# Patient Record
Sex: MALE | Race: WHITE | Employment: OTHER | URBAN - NONMETROPOLITAN AREA
[De-identification: names, ages, dates, MRNs, and addresses within clinical notes are randomized per-mention and may not be internally consistent; named-entity substitution may affect disease eponyms.]

---

## 2020-08-10 PROBLEM — E78.2 MIXED HYPERLIPIDEMIA: Status: ACTIVE | Noted: 2020-08-10

## 2020-08-10 PROBLEM — E11.9 DIABETES MELLITUS TYPE 2, CONTROLLED (HCC): Status: ACTIVE | Noted: 2020-08-10

## 2020-08-10 PROBLEM — I10 ESSENTIAL HYPERTENSION: Status: ACTIVE | Noted: 2020-08-10

## 2020-08-10 RX ORDER — DOCUSATE SODIUM 100 MG/1
100 CAPSULE, LIQUID FILLED ORAL 2 TIMES DAILY
COMMUNITY

## 2020-08-10 RX ORDER — ATORVASTATIN CALCIUM 20 MG/1
TABLET, FILM COATED ORAL DAILY
COMMUNITY
End: 2022-01-01 | Stop reason: SDUPTHER

## 2020-08-10 RX ORDER — METOPROLOL TARTRATE 50 MG/1
TABLET ORAL 2 TIMES DAILY
COMMUNITY
End: 2022-01-01

## 2020-08-10 RX ORDER — LATANOPROST 50 UG/ML
1 SOLUTION/ DROPS OPHTHALMIC
COMMUNITY

## 2020-08-10 RX ORDER — BRIMONIDINE TARTRATE 2 MG/ML
1 SOLUTION/ DROPS OPHTHALMIC 3 TIMES DAILY
COMMUNITY
End: 2022-01-01

## 2020-08-10 RX ORDER — METFORMIN HYDROCHLORIDE 500 MG/1
TABLET ORAL 2 TIMES DAILY WITH MEALS
COMMUNITY
End: 2020-09-01 | Stop reason: SDUPTHER

## 2020-08-10 RX ORDER — ASPIRIN 81 MG/1
TABLET ORAL DAILY
COMMUNITY

## 2020-08-10 RX ORDER — FUROSEMIDE 20 MG/1
20 TABLET ORAL DAILY
COMMUNITY

## 2020-08-10 RX ORDER — ALBUTEROL SULFATE 90 UG/1
AEROSOL, METERED RESPIRATORY (INHALATION)
COMMUNITY
End: 2020-09-24 | Stop reason: CLARIF

## 2020-08-10 RX ORDER — LISINOPRIL 20 MG/1
TABLET ORAL DAILY
COMMUNITY
End: 2022-01-01

## 2020-08-10 RX ORDER — LEVOFLOXACIN 750 MG/1
750 TABLET ORAL DAILY
COMMUNITY
End: 2020-09-24

## 2020-08-10 RX ORDER — SACUBITRIL AND VALSARTAN 24; 26 MG/1; MG/1
1 TABLET, FILM COATED ORAL 2 TIMES DAILY
COMMUNITY
End: 2021-01-01 | Stop reason: SDUPTHER

## 2020-08-10 RX ORDER — NITROGLYCERIN 0.4 MG/1
0.4 TABLET SUBLINGUAL
COMMUNITY

## 2020-08-10 RX ORDER — AMIODARONE HYDROCHLORIDE 200 MG/1
200 TABLET ORAL DAILY
COMMUNITY
End: 2021-01-01 | Stop reason: SDUPTHER

## 2020-08-10 RX ORDER — TIMOLOL MALEATE 5 MG/ML
1 SOLUTION/ DROPS OPHTHALMIC 2 TIMES DAILY
COMMUNITY
End: 2022-01-01

## 2020-08-10 RX ORDER — CARVEDILOL 12.5 MG/1
12.5 TABLET ORAL 2 TIMES DAILY WITH MEALS
COMMUNITY
End: 2021-01-01 | Stop reason: SDUPTHER

## 2020-08-10 RX ORDER — MODAFINIL 200 MG/1
200 TABLET ORAL DAILY
COMMUNITY
End: 2020-09-01 | Stop reason: SDUPTHER

## 2020-08-10 RX ORDER — ERGOCALCIFEROL 1.25 MG/1
50000 CAPSULE ORAL
COMMUNITY
End: 2022-01-01

## 2020-08-10 RX ORDER — DESLORATADINE 5 MG/1
5 TABLET ORAL
COMMUNITY
End: 2022-01-01

## 2020-08-10 RX ORDER — SPIRONOLACTONE 25 MG/1
TABLET ORAL DAILY
COMMUNITY
End: 2021-01-01 | Stop reason: SDUPTHER

## 2020-08-31 DIAGNOSIS — E11.9 CONTROLLED TYPE 2 DIABETES MELLITUS WITHOUT COMPLICATION, WITHOUT LONG-TERM CURRENT USE OF INSULIN (HCC): Primary | ICD-10-CM

## 2020-08-31 DIAGNOSIS — G47.00 INSOMNIA, UNSPECIFIED TYPE: ICD-10-CM

## 2020-08-31 NOTE — TELEPHONE ENCOUNTER
Pt dropped off letter stating he needed:     januvia 100 mg / 1 daily 90 day supply with refills sent to  escripts    Modafinil 200mg 30 days with 5 refills (HE NEEDS A HARD COPY OF THIS PRINTED AND SIGNED FOR HIM TO )      Metformin 500 mg 4 tab daily 90 days supply with refills escript      Letter will be scanned into chart if you need clarification

## 2020-09-01 RX ORDER — METFORMIN HYDROCHLORIDE 500 MG/1
1000 TABLET ORAL 2 TIMES DAILY WITH MEALS
Qty: 360 TAB | Refills: 0 | Status: SHIPPED | OUTPATIENT
Start: 2020-09-01 | End: 2020-11-09

## 2020-09-01 RX ORDER — MODAFINIL 200 MG/1
200 TABLET ORAL DAILY
Qty: 30 TAB | Refills: 5 | Status: SHIPPED | OUTPATIENT
Start: 2020-09-01 | End: 2020-09-04 | Stop reason: SDUPTHER

## 2020-09-04 ENCOUNTER — VIRTUAL VISIT (OUTPATIENT)
Dept: FAMILY MEDICINE CLINIC | Age: 75
End: 2020-09-04
Payer: MEDICARE

## 2020-09-04 DIAGNOSIS — Z13.29 SCREENING FOR THYROID DISORDER: ICD-10-CM

## 2020-09-04 DIAGNOSIS — E55.9 VITAMIN D DEFICIENCY: ICD-10-CM

## 2020-09-04 DIAGNOSIS — E11.9 CONTROLLED TYPE 2 DIABETES MELLITUS WITHOUT COMPLICATION, WITHOUT LONG-TERM CURRENT USE OF INSULIN (HCC): ICD-10-CM

## 2020-09-04 DIAGNOSIS — Z12.5 SCREENING FOR MALIGNANT NEOPLASM OF PROSTATE: ICD-10-CM

## 2020-09-04 DIAGNOSIS — Z11.59 NEED FOR HEPATITIS C SCREENING TEST: ICD-10-CM

## 2020-09-04 DIAGNOSIS — E78.2 MIXED HYPERLIPIDEMIA: ICD-10-CM

## 2020-09-04 DIAGNOSIS — E53.8 VITAMIN B12 DEFICIENCY: ICD-10-CM

## 2020-09-04 DIAGNOSIS — I10 ESSENTIAL HYPERTENSION: Primary | ICD-10-CM

## 2020-09-04 DIAGNOSIS — G47.00 INSOMNIA, UNSPECIFIED TYPE: ICD-10-CM

## 2020-09-04 DIAGNOSIS — I10 ESSENTIAL HYPERTENSION: ICD-10-CM

## 2020-09-04 PROCEDURE — 99214 OFFICE O/P EST MOD 30 MIN: CPT | Performed by: NURSE PRACTITIONER

## 2020-09-04 RX ORDER — MODAFINIL 200 MG/1
200 TABLET ORAL DAILY
Qty: 30 TAB | Refills: 5 | Status: SHIPPED | OUTPATIENT
Start: 2020-09-04 | End: 2020-09-08 | Stop reason: SDUPTHER

## 2020-09-04 NOTE — PROGRESS NOTES
Huma Egan presents today for   Chief Complaint   Patient presents with    Follow-up    Labs     Patient apt being completed virtually through [employer]. me. Patient consents to virtual apt. Depression Screening:  3 most recent PHQ Screens 9/4/2020   Little interest or pleasure in doing things Not at all   Feeling down, depressed, irritable, or hopeless Not at all   Total Score PHQ 2 0       Learning Assessment:  Learning Assessment 9/4/2020   PRIMARY LEARNER Patient   HIGHEST LEVEL OF EDUCATION - PRIMARY LEARNER  SOME COLLEGE   BARRIERS PRIMARY LEARNER NONE   CO-LEARNER CAREGIVER No   PRIMARY LANGUAGE ENGLISH   LEARNER PREFERENCE PRIMARY LISTENING   ANSWERED BY patient   RELATIONSHIP SELF       Abuse Screening:  Abuse Screening Questionnaire 9/4/2020   Do you ever feel afraid of your partner? N   Are you in a relationship with someone who physically or mentally threatens you? N   Is it safe for you to go home? Y       Fall Risk  Fall Risk Assessment, last 12 mths 9/4/2020   Able to walk? Yes   Fall in past 12 months? No       Health Maintenance reviewed and discussed and ordered per Provider. Health Maintenance Due   Topic Date Due    Hepatitis C Screening  1945    Foot Exam Q1  07/21/1955    A1C test (Diabetic or Prediabetic)  07/21/1955    MICROALBUMIN Q1  07/21/1955    Eye Exam Retinal or Dilated  07/21/1955    Lipid Screen  07/21/1955    DTaP/Tdap/Td series (1 - Tdap) 07/21/1966    Shingrix Vaccine Age 50> (1 of 2) 07/21/1995    GLAUCOMA SCREENING Q2Y  07/21/2010    Medicare Yearly Exam  03/03/2020    Flu Vaccine (1) 09/01/2020   . Coordination of Care:  1. Have you been to the ER, urgent care clinic since your last visit? Hospitalized since your last visit? No    2. Have you seen or consulted any other health care providers outside of the [address] [place] [address] since your last visit? Include any pap smears or colon screening.  No

## 2020-09-08 RX ORDER — MODAFINIL 200 MG/1
200 TABLET ORAL DAILY
Qty: 30 TAB | Refills: 5 | Status: SHIPPED | OUTPATIENT
Start: 2020-09-08 | End: 2021-03-12 | Stop reason: SDUPTHER

## 2020-09-24 ENCOUNTER — OFFICE VISIT (OUTPATIENT)
Dept: FAMILY MEDICINE CLINIC | Age: 75
End: 2020-09-24
Payer: MEDICARE

## 2020-09-24 ENCOUNTER — HOSPITAL ENCOUNTER (OUTPATIENT)
Dept: GENERAL RADIOLOGY | Age: 75
Discharge: HOME | End: 2020-09-24
Attending: INTERNAL MEDICINE
Payer: MEDICARE

## 2020-09-24 VITALS — TEMPERATURE: 97.7 F | OXYGEN SATURATION: 92 % | HEART RATE: 74 BPM | RESPIRATION RATE: 20 BRPM

## 2020-09-24 DIAGNOSIS — Z20.822 SUSPECTED COVID-19 VIRUS INFECTION: ICD-10-CM

## 2020-09-24 DIAGNOSIS — R05.9 COUGH: ICD-10-CM

## 2020-09-24 DIAGNOSIS — J40 BRONCHITIS: Primary | ICD-10-CM

## 2020-09-24 DIAGNOSIS — J40 BRONCHITIS: ICD-10-CM

## 2020-09-24 PROCEDURE — 99214 OFFICE O/P EST MOD 30 MIN: CPT | Performed by: INTERNAL MEDICINE

## 2020-09-24 PROCEDURE — G0444 DEPRESSION SCREEN ANNUAL: HCPCS | Performed by: INTERNAL MEDICINE

## 2020-09-24 PROCEDURE — 71046 X-RAY EXAM CHEST 2 VIEWS: CPT

## 2020-09-24 RX ORDER — METHYLPREDNISOLONE 4 MG/1
TABLET ORAL
Qty: 1 DOSE PACK | Refills: 0 | Status: SHIPPED | OUTPATIENT
Start: 2020-09-24 | End: 2021-04-05 | Stop reason: ALTCHOICE

## 2020-09-24 RX ORDER — ALBUTEROL SULFATE 90 UG/1
1 AEROSOL, METERED RESPIRATORY (INHALATION)
Qty: 1 INHALER | Refills: 2 | Status: SHIPPED | OUTPATIENT
Start: 2020-09-24 | End: 2020-12-07 | Stop reason: SDUPTHER

## 2020-09-24 RX ORDER — DOXYCYCLINE 100 MG/1
100 CAPSULE ORAL 2 TIMES DAILY
Qty: 20 CAP | Refills: 0 | Status: SHIPPED | OUTPATIENT
Start: 2020-09-24 | End: 2020-10-04

## 2020-09-24 NOTE — PROGRESS NOTES
Rosita Temple presents today for   Chief Complaint   Patient presents with    Cough     runny nose, sinus congestion       Is someone accompanying this pt? no    Is the patient using any DME equipment during OV? no    Depression Screening:  3 most recent PHQ Screens 9/24/2020   Little interest or pleasure in doing things Not at all   Feeling down, depressed, irritable, or hopeless Not at all   Total Score PHQ 2 0       Learning Assessment:  Learning Assessment 9/4/2020   PRIMARY LEARNER Patient   HIGHEST LEVEL OF EDUCATION - PRIMARY LEARNER  SOME COLLEGE   BARRIERS PRIMARY LEARNER NONE   CO-LEARNER CAREGIVER No   PRIMARY LANGUAGE ENGLISH   LEARNER PREFERENCE PRIMARY LISTENING   ANSWERED BY patient   RELATIONSHIP SELF       Abuse Screening:  Abuse Screening Questionnaire 9/4/2020   Do you ever feel afraid of your partner? N   Are you in a relationship with someone who physically or mentally threatens you? N   Is it safe for you to go home? Y       Fall Risk  Fall Risk Assessment, last 12 mths 9/24/2020   Able to walk? Yes   Fall in past 12 months? Yes   Fall with injury? No   Number of falls in past 12 months 1   Fall Risk Score 1       ADL  No flowsheet data found. Health Maintenance reviewed and discussed and ordered per Provider. Health Maintenance Due   Topic Date Due    Hepatitis C Screening  1945    Foot Exam Q1  07/21/1955    A1C test (Diabetic or Prediabetic)  07/21/1955    MICROALBUMIN Q1  07/21/1955    Eye Exam Retinal or Dilated  07/21/1955    Lipid Screen  07/21/1955    Shingrix Vaccine Age 50> (1 of 2) 07/21/1995    GLAUCOMA SCREENING Q2Y  07/21/2010    Medicare Yearly Exam  03/03/2020   . Coordination of Care:  1. Have you been to the ER, urgent care clinic since your last visit? Hospitalized since your last visit? no    2. Have you seen or consulted any other health care providers outside of the [address] [place] [address] since your last visit?  Include any pap smears or colon screening.  no

## 2020-09-24 NOTE — PROGRESS NOTES
1. Bronchitis  Patient has bronchitis. Given his recent history of pneumonia though I am going to check a stat PA and lateral chest x-ray. The patient is also on amiodarone which makes the prescribing of levofloxacin or azithromycin somewhat [place]. It is well-known to her creates significant prolonged QT intervals which can lead to death. We will go ahead and use doxycycline for now given that it does have MRSA coverage and other bacterial pathogen coverage. Very bronchospastic, go to write him for albuterol and a Medrol Dosepak hopefully to suppress some of this inflammation  - doxycycline (VIBRAMYCIN) 100 mg capsule; Take 1 Cap by mouth two (2) times a day for 10 days. Dispense: 20 Cap; Refill: 0  - XR CHEST PA LAT W APICAL LORDO; Future  - albuterol (PROVENTIL HFA, VENTOLIN HFA, PROAIR HFA) 90 mcg/actuation inhaler; Take 1 Puff by inhalation every four (4) hours as needed for Wheezing. Dispense: 1 Inhaler; Refill: 2  - methylPREDNISolone (MEDROL DOSEPACK) 4 mg tablet; Take as directed  Dispense: 1 Dose Pack; Refill: 0    2. Cough  Given his symptoms I am going to go ahead and check an COVID-19 test on him  - XR CHEST PA LAT W APICAL LORDO; Future  - NOVEL CORONAVIRUS (COVID-19); Future    3. Suspected COVID-19 virus infection  Although suspicion is low to moderate but given his age and underlying lung disease we will go ahead and check in for COVID-19  - NOVEL CORONAVIRUS (COVID-19); Future  It was always also told emphatically that she should he develop any chest pain worsening chest pressure or shortness of breath he is to dial 911 or have someone take him to the nearest emergency room    Total time spent was approx 25 minutes more than 50% spent in counselling and coordination of care including discussion of workup and prescriptions.     Chief Complaint   Patient presents with    Cough     runny nose, sinus congestion        HPI   This is a very pleasant 42-year-old gentleman with a history of atrial fibrillation and underlying lung disease who presents today complaining of 5 days of cough congestion scratchy throat and some wheezing. He has not run a fever over the past 5 days and has been checking it regularly. He denies any body aches. He does report some mild shortness of breath with exertion. He has no shortness of breath when laying flat. He denies any sick contacts. He does report a history of a pneumonia several months ago and there is what appears to be the prescription for Levaquin from March of this year which he tolerated. But it is unclear if he was on amiodarone at this time as well. He denies any palpitations or chest pain or pressure. He does report some mild postnasal drip but no headaches. Patient Active Problem List   Diagnosis Code    Essential hypertension I10    Mixed hyperlipidemia E78.2    [facility], [facility] E11.9        Current Outpatient Medications on File Prior to Visit   Medication Sig Dispense Refill    modafiniL (PROVIGIL) 200 mg tablet Take 1 Tab by mouth daily. Max Daily Amount: 200 mg. 30 Tab 5    SITagliptin (Januvia) 100 mg tablet Take 1 Tab by mouth daily. 90 Tab 0    metFORMIN (GLUCOPHAGE) 500 mg tablet Take 2 Tabs by mouth two (2) times daily (with meals). 360 Tab 0    amiodarone (CORDARONE) 200 mg tablet Take  by mouth.  aspirin delayed-release 81 mg tablet Take  by mouth daily.  atorvastatin (LIPITOR) 20 mg tablet Take  by mouth daily.  brimonidine (ALPHAGAN) 0.2 % ophthalmic solution Administer 1 Drop to both eyes three (3) times daily.  carvediloL (COREG) 12.5 mg tablet Take  by mouth two (2) times daily (with meals).  desloratadine (CLARINEX) 5 mg tablet Take 5 mg by mouth.  docusate sodium (COLACE) 100 mg capsule Take 100 mg by mouth two (2) times a day.  apixaban (Eliquis) 5 mg tablet Take 5 mg by mouth two (2) times a day.       sacubitriL-valsartan (Entresto) 24-26 mg tablet Take 1 Tab by mouth two (2) times a day.  ergocalciferol (Vitamin D2) 1,250 mcg (50,000 unit) capsule Take 50,000 Units by mouth.  FLUTICASONE PROPIONATE NA by Nasal route.  furosemide (LASIX) 20 mg tablet Take  by mouth daily.  latanoprost (XALATAN) 0.005 % ophthalmic solution Administer 1 Drop to both eyes nightly.  lisinopriL (PRINIVIL, ZESTRIL) 20 mg tablet Take  by mouth daily.  metoprolol tartrate (LOPRESSOR) 50 mg tablet Take  by mouth two (2) times a day.  nitroglycerin (Nitrostat) 0.4 mg SL tablet 0.4 mg by SubLINGual route every five (5) minutes as needed for Chest Pain. Up to 3 doses.  spironolactone (ALDACTONE) 25 mg tablet Take  by mouth daily.  timolol (TIMOPTIC) 0.5 % ophthalmic solution 1 Drop two (2) times a day. No current facility-administered medications on file prior to visit. ROS  - GEN: no weight gain/loss, - fevers - chills  - HEENT: no vision changes, no tinnitus, + scratchy throat  - CV: no cp, palpitations or edema  - RESP: + sob, +cough  - ABD: no n/v/d, no blood in stool  - : no dysuria or changes in freq. - SKIN: no rashes, ulcers  - Neuro: no resting tremors, parasthesia in extremities, no headaches  - MS: No weakness in extremities, no gait abnormalities  - Psych: negative for depression or anxiety      Visit Vitals  Pulse 74   Temp 97.7 °F (36.5 °C)   Resp 20   SpO2 92%        Physical Exam   Physical Exam  Constitutional:       Appearance: Normal appearance. weight. NAD and pleasant  HENT:      Head: Normocephalic. Nose: Nose normal.      Mouth/Throat:      Mouth: Mucous membranes are moist. Throat slightly inflammed, pnd noted  Eyes:      Extraocular Movements: Extraocular movements intact. Conjunctiva/sclera: Conjunctivae normal. Sclera anicteric     Pupils: Pupils are equal, round, and reactive to light. Cardiovascular:      Rate and Rhythm: rrr with some ectopy 2/6 sm lsb      Pulses: Normal pulses.    Pulmonary: Effort: No respiratory distress. Breath sounds: wheezes throughout. Abdominal:      General: There is no distension. NT, ND  Neurological:      Mental Status: patient is alert and oriented times 3.  No resting tremor, normal gait     Cranial Nerves: cranial nerves grossly intact  Muskuloskeletal     Full ROM in extremities     Normal gait  Skin     Dry without lesions on examined areas, warm to the touch       Deferred  Psychiatry     Calm, normal affect, interacting normally

## 2020-09-25 DIAGNOSIS — J40 BRONCHITIS: Primary | ICD-10-CM

## 2020-09-26 LAB — SARS-COV-2, NAA: NOT DETECTED

## 2020-09-30 ENCOUNTER — VIRTUAL VISIT (OUTPATIENT)
Dept: NEPHROLOGY | Age: 75
End: 2020-09-30

## 2020-09-30 DIAGNOSIS — E87.1 HYPONATREMIA: Primary | ICD-10-CM

## 2020-09-30 DIAGNOSIS — E87.1 HYPONATREMIA: ICD-10-CM

## 2020-09-30 NOTE — PROGRESS NOTES
Verbal order from Dr. Do Ask to place order for BMP and Urine Osmolarity for patient to complete prior to 12 week followup.  Bfjie peralta

## 2020-09-30 NOTE — PROGRESS NOTES
Progress Note    Patient: Lisa Vivas MRN: [id number]  SSN: [id number]    YOB: 1945  Age: 76 y.o. Sex: male      Admit Date: (Not on file)   [unfilled]    Subjective:       Objective: There were no vitals filed for this visit. Intake and Output:  Current Shift: [unfilled]  Last three shifts: [unfilled]    Physical Exam:   General: NAD  Eyes: sclera anicteric  Oral Cavity: No thrush or ulcers  Neck: no JVD  Chest: Fair bilateral air entry  Heart: normal sounds  Abdomen: soft and non tender   : no duke  Lower Extremities: no edema  Skin: no rash  Neuro: intact  Psychiatric: non-depressed      Lab/Data Review:  Recent Glucose Results: No results found for: GLU          Assessment:         Plan:     1. Hyponatremia:  - chronic in nature  - of moderate severity  - likely from low solute intake and increased water intake  - his sodium was 129, had improved to 137  - Na has dropped to 134 again   - urine sodium 101 and urine osmolarity 727  - normal TSH level  - he was advised to continue on restricted fluid intake  - I will recheck BMP in 3 months. 2. Hypertension.  -Blood pressure is okay. -I will continue the same blood pressure medicine.         Signed By: Margaret Cota MD     September 30, 2020

## 2020-09-30 NOTE — PROGRESS NOTES
Francesco Xavier presents  Chief Complaint   Patient presents with    Routine     lab review            Depression Screening:  3 most recent PHQ Screens 9/24/2020   Little interest or pleasure in doing things Not at all   Feeling down, depressed, irritable, or hopeless Not at all   Total Score PHQ 2 0       Learning Assessment:  Learning Assessment 9/24/2020   PRIMARY LEARNER Patient   HIGHEST LEVEL OF EDUCATION - PRIMARY LEARNER  SOME COLLEGE   BARRIERS PRIMARY LEARNER NONE   CO-LEARNER CAREGIVER -   PRIMARY LANGUAGE ENGLISH   LEARNER PREFERENCE PRIMARY READING   ANSWERED BY patient   RELATIONSHIP SELF       Abuse Screening:  Abuse Screening Questionnaire 9/24/2020   Do you ever feel afraid of your partner? N   Are you in a relationship with someone who physically or mentally threatens you? N   Is it safe for you to go home? Y       Fall Risk  Fall Risk Assessment, last 12 mths 9/24/2020   Able to walk? Yes   Fall in past 12 months? Yes   Fall with injury? No   Number of falls in past 12 months 1   Fall Risk Score 1       ADL  ADL Assessment 9/24/2020   Feeding yourself No Help Needed   Getting from bed to chair No Help Needed   Getting dressed No Help Needed   Bathing or showering No Help Needed   Walk across the room (includes cane/walker) No Help Needed   Using the telphone No Help Needed   Taking your medications No Help Needed   Preparing meals No Help Needed   Managing money (expenses/bills) No Help Needed   Moderately strenuous housework (laundry) No Help Needed   Shopping for personal items (toiletries/medicines) No Help Needed   Shopping for groceries No Help Needed   Driving No Help Needed   Climbing a flight of stairs No Help Needed   Getting to places beyond walking distances No Help Needed       Health Maintenance reviewed and discussed and ordered per Provider.     Health Maintenance Due   Topic Date Due    Hepatitis C Screening  1945    Foot Exam Q1  07/21/1955    A1C test (Diabetic or Prediabetic)  07/21/1955    MICROALBUMIN Q1  07/21/1955    Eye Exam Retinal or Dilated  07/21/1955    Lipid Screen  07/21/1955    Shingrix Vaccine Age 50> (1 of 2) 07/21/1995    GLAUCOMA SCREENING Q2Y  07/21/2010    Medicare Yearly Exam  03/03/2020   . Coordination of Care:  1. Have you been to the ER, urgent care clinic since your last visit? Hospitalized since your last visit? no    2. Have you seen or consulted any other health care providers outside of the [facility] since your last visit? Include any pap smears or colon screening.  no

## 2020-10-20 NOTE — PROGRESS NOTES
Contacted [facility] lab and they are going to send over legible copies for you.  jie Brandon

## 2020-11-07 DIAGNOSIS — E11.9 CONTROLLED TYPE 2 DIABETES MELLITUS WITHOUT COMPLICATION, WITHOUT LONG-TERM CURRENT USE OF INSULIN (HCC): ICD-10-CM

## 2020-11-09 RX ORDER — METFORMIN HYDROCHLORIDE 500 MG/1
TABLET ORAL
Qty: 360 TAB | Refills: 3 | Status: SHIPPED | OUTPATIENT
Start: 2020-11-09

## 2020-11-30 ENCOUNTER — TELEPHONE (OUTPATIENT)
Dept: FAMILY MEDICINE CLINIC | Age: 75
End: 2020-11-30

## 2020-11-30 NOTE — TELEPHONE ENCOUNTER
Patient called and left a message : giving you my lab results from cardio on my tsh 5.68 and A1C 6.5    He said he would like a call back with an email address so he can send the records for his chart

## 2020-12-01 DIAGNOSIS — R94.6 ABNORMAL THYROID FUNCTION TEST: ICD-10-CM

## 2020-12-01 DIAGNOSIS — R79.89 ELEVATED TSH: Primary | ICD-10-CM

## 2020-12-02 ENCOUNTER — HOSPITAL ENCOUNTER (OUTPATIENT)
Dept: LAB | Age: 75
Discharge: HOME | End: 2020-12-02
Payer: MEDICARE

## 2020-12-02 LAB — TSH SERPL DL<=0.05 MIU/L-ACNC: 4.12 UIU/ML (ref 0.35–6.2)

## 2020-12-02 PROCEDURE — 84481 FREE ASSAY (FT-3): CPT

## 2020-12-02 PROCEDURE — 84439 ASSAY OF FREE THYROXINE: CPT

## 2020-12-02 PROCEDURE — 36415 COLL VENOUS BLD VENIPUNCTURE: CPT

## 2020-12-02 PROCEDURE — 84443 ASSAY THYROID STIM HORMONE: CPT

## 2020-12-03 LAB — T4 FREE SERPL-MCNC: 0.9 NG/DL (ref 0.8–1.5)

## 2020-12-03 NOTE — PROGRESS NOTES
Please advise patient that his thyroid levels are all within normal limits we will continue to monitor

## 2020-12-07 DIAGNOSIS — J40 BRONCHITIS: ICD-10-CM

## 2020-12-07 RX ORDER — ALBUTEROL SULFATE 90 UG/1
1 AEROSOL, METERED RESPIRATORY (INHALATION)
Qty: 3 INHALER | Refills: 1 | Status: SHIPPED | OUTPATIENT
Start: 2020-12-07 | End: 2020-12-17 | Stop reason: SDUPTHER

## 2020-12-17 DIAGNOSIS — J40 BRONCHITIS: ICD-10-CM

## 2020-12-17 RX ORDER — ALBUTEROL SULFATE 90 UG/1
1 AEROSOL, METERED RESPIRATORY (INHALATION)
Qty: 3 INHALER | Refills: 1 | Status: SHIPPED | OUTPATIENT
Start: 2020-12-17

## 2021-01-01 ENCOUNTER — OFFICE VISIT (OUTPATIENT)
Dept: FAMILY MEDICINE CLINIC | Age: 76
End: 2021-01-01
Payer: MEDICARE

## 2021-01-01 ENCOUNTER — TELEPHONE (OUTPATIENT)
Dept: FAMILY MEDICINE CLINIC | Age: 76
End: 2021-01-01

## 2021-01-01 ENCOUNTER — HOSPITAL ENCOUNTER (OUTPATIENT)
Dept: LAB | Age: 76
Discharge: HOME | End: 2021-10-29
Payer: MEDICARE

## 2021-01-01 ENCOUNTER — PATIENT MESSAGE (OUTPATIENT)
Dept: FAMILY MEDICINE CLINIC | Age: 76
End: 2021-01-01

## 2021-01-01 VITALS
SYSTOLIC BLOOD PRESSURE: 118 MMHG | TEMPERATURE: 97.5 F | HEART RATE: 80 BPM | OXYGEN SATURATION: 91 % | HEIGHT: 68 IN | BODY MASS INDEX: 31.61 KG/M2 | WEIGHT: 208.6 LBS | DIASTOLIC BLOOD PRESSURE: 72 MMHG

## 2021-01-01 DIAGNOSIS — E11.9 CONTROLLED TYPE 2 DIABETES MELLITUS WITHOUT COMPLICATION, WITHOUT LONG-TERM CURRENT USE OF INSULIN (HCC): ICD-10-CM

## 2021-01-01 DIAGNOSIS — Z12.5 SCREENING FOR MALIGNANT NEOPLASM OF PROSTATE: ICD-10-CM

## 2021-01-01 DIAGNOSIS — E78.2 MIXED HYPERLIPIDEMIA: ICD-10-CM

## 2021-01-01 DIAGNOSIS — R05.9 COUGH: Primary | ICD-10-CM

## 2021-01-01 DIAGNOSIS — E53.8 VITAMIN B12 DEFICIENCY: ICD-10-CM

## 2021-01-01 DIAGNOSIS — E55.9 VITAMIN D DEFICIENCY: ICD-10-CM

## 2021-01-01 DIAGNOSIS — Z23 NEEDS FLU SHOT: Primary | ICD-10-CM

## 2021-01-01 DIAGNOSIS — G47.00 INSOMNIA, UNSPECIFIED TYPE: ICD-10-CM

## 2021-01-01 DIAGNOSIS — I10 ESSENTIAL HYPERTENSION: ICD-10-CM

## 2021-01-01 DIAGNOSIS — G47.33 OSA (OBSTRUCTIVE SLEEP APNEA): ICD-10-CM

## 2021-01-01 LAB
25(OH)D3 SERPL-MCNC: 32.9 NG/ML (ref 30–100)
ALBUMIN SERPL-MCNC: 4 G/DL (ref 3.5–4.7)
ALBUMIN/GLOB SERPL: 1.1 {RATIO}
ALP SERPL-CCNC: 58 U/L (ref 38–126)
ALT SERPL-CCNC: 41 U/L (ref 3–72)
ANION GAP SERPL CALC-SCNC: 10 MMOL/L
AST SERPL W P-5'-P-CCNC: 44 U/L (ref 17–74)
BILIRUB SERPL-MCNC: 0.9 MG/DL (ref 0.2–1)
BUN SERPL-MCNC: 15 MG/DL (ref 9–21)
BUN/CREAT SERPL: 15
CA-I BLD-MCNC: 9.3 MG/DL (ref 8.5–10.5)
CHLORIDE SERPL-SCNC: 102 MMOL/L (ref 94–111)
CHOLEST SERPL-MCNC: 152 MG/DL
CO2 SERPL-SCNC: 21 MMOL/L (ref 21–33)
CREAT SERPL-MCNC: 1 MG/DL (ref 0.8–1.5)
CREAT UR-MCNC: 155 MG/DL (ref 30–125)
ERYTHROCYTE [DISTWIDTH] IN BLOOD BY AUTOMATED COUNT: 13.6 % (ref 11.6–14.5)
EST. AVERAGE GLUCOSE BLD GHB EST-MCNC: 137 MG/DL
GLOBULIN SER CALC-MCNC: 3.5 G/DL
GLUCOSE SERPL-MCNC: 128 MG/DL (ref 70–110)
HBA1C MFR BLD: 6.4 % (ref 4.2–5.6)
HCT VFR BLD AUTO: 42.2 % (ref 36–48)
HDLC SERPL-MCNC: 52 MG/DL (ref 40–60)
HDLC SERPL: 2.9 {RATIO} (ref 0–5)
HGB BLD-MCNC: 13.9 G/DL (ref 13–16)
LDLC SERPL CALC-MCNC: 70.8 MG/DL (ref 0–100)
LIPID PROFILE,FLP: NORMAL
MCH RBC QN AUTO: 29.8 PG (ref 24–34)
MCHC RBC AUTO-ENTMCNC: 32.9 G/DL (ref 31–37)
MCV RBC AUTO: 90.6 FL (ref 78–100)
MICROALBUMIN UR-MCNC: 3.01 MG/DL (ref 0–3)
MICROALBUMIN/CREAT UR-RTO: 19 MGMALB/GCRE (ref 0–30)
NRBC # BLD: 0 K/UL (ref 0–0.01)
NRBC BLD-RTO: 0 PER 100 WBC
PLATELET # BLD AUTO: 219 K/UL (ref 135–420)
PMV BLD AUTO: 10.9 FL (ref 9.2–11.8)
POTASSIUM SERPL-SCNC: 4.3 MMOL/L (ref 3.2–5.1)
PROT SERPL-MCNC: 7.5 G/DL (ref 6.1–8.4)
PSA SERPL-MCNC: 0.7 NG/ML (ref 0–4)
RBC # BLD AUTO: 4.66 M/UL (ref 4.35–5.65)
SODIUM SERPL-SCNC: 133 MMOL/L (ref 135–145)
TRIGL SERPL-MCNC: 146 MG/DL (ref ?–150)
TSH SERPL DL<=0.05 MIU/L-ACNC: 5.43 UIU/ML (ref 0.35–6.2)
VIT B12 SERPL-MCNC: 687 PG/ML (ref 211–911)
VLDLC SERPL CALC-MCNC: 29.2 MG/DL
WBC # BLD AUTO: 11.3 K/UL (ref 4.6–13.2)

## 2021-01-01 PROCEDURE — 82043 UR ALBUMIN QUANTITATIVE: CPT

## 2021-01-01 PROCEDURE — 80061 LIPID PANEL: CPT

## 2021-01-01 PROCEDURE — G8419 CALC BMI OUT NRM PARAM NOF/U: HCPCS | Performed by: NURSE PRACTITIONER

## 2021-01-01 PROCEDURE — G8432 DEP SCR NOT DOC, RNG: HCPCS | Performed by: NURSE PRACTITIONER

## 2021-01-01 PROCEDURE — 1101F PT FALLS ASSESS-DOCD LE1/YR: CPT | Performed by: NURSE PRACTITIONER

## 2021-01-01 PROCEDURE — G8536 NO DOC ELDER MAL SCRN: HCPCS | Performed by: NURSE PRACTITIONER

## 2021-01-01 PROCEDURE — G8427 DOCREV CUR MEDS BY ELIG CLIN: HCPCS | Performed by: NURSE PRACTITIONER

## 2021-01-01 PROCEDURE — 84153 ASSAY OF PSA TOTAL: CPT

## 2021-01-01 PROCEDURE — G8756 NO BP MEASURE DOC: HCPCS | Performed by: NURSE PRACTITIONER

## 2021-01-01 PROCEDURE — G0008 ADMIN INFLUENZA VIRUS VAC: HCPCS | Performed by: NURSE PRACTITIONER

## 2021-01-01 PROCEDURE — 99214 OFFICE O/P EST MOD 30 MIN: CPT | Performed by: NURSE PRACTITIONER

## 2021-01-01 PROCEDURE — 90694 VACC AIIV4 NO PRSRV 0.5ML IM: CPT | Performed by: NURSE PRACTITIONER

## 2021-01-01 PROCEDURE — 85027 COMPLETE CBC AUTOMATED: CPT

## 2021-01-01 PROCEDURE — 80053 COMPREHEN METABOLIC PANEL: CPT

## 2021-01-01 PROCEDURE — 84443 ASSAY THYROID STIM HORMONE: CPT

## 2021-01-01 PROCEDURE — 82306 VITAMIN D 25 HYDROXY: CPT

## 2021-01-01 PROCEDURE — 82607 VITAMIN B-12: CPT

## 2021-01-01 PROCEDURE — 83036 HEMOGLOBIN GLYCOSYLATED A1C: CPT

## 2021-01-01 RX ORDER — SPIRONOLACTONE 25 MG/1
25 TABLET ORAL DAILY
Qty: 90 TABLET | Refills: 1 | Status: SHIPPED | OUTPATIENT
Start: 2021-01-01

## 2021-01-01 RX ORDER — MODAFINIL 200 MG/1
200 TABLET ORAL DAILY
Qty: 90 TABLET | Refills: 1 | Status: SHIPPED | OUTPATIENT
Start: 2021-01-01 | End: 2022-01-01 | Stop reason: SDUPTHER

## 2021-01-01 RX ORDER — SACUBITRIL AND VALSARTAN 24; 26 MG/1; MG/1
1 TABLET, FILM COATED ORAL 2 TIMES DAILY
Qty: 180 TABLET | Refills: 1 | Status: SHIPPED | OUTPATIENT
Start: 2021-01-01 | End: 2022-01-01 | Stop reason: SDUPTHER

## 2021-01-01 RX ORDER — CARVEDILOL 12.5 MG/1
12.5 TABLET ORAL 2 TIMES DAILY WITH MEALS
Qty: 90 TABLET | Refills: 2 | Status: SHIPPED | OUTPATIENT
Start: 2021-01-01 | End: 2022-01-01 | Stop reason: SDUPTHER

## 2021-01-01 RX ORDER — AMIODARONE HYDROCHLORIDE 200 MG/1
200 TABLET ORAL DAILY
Qty: 90 TABLET | Refills: 2 | Status: SHIPPED | OUTPATIENT
Start: 2021-01-01 | End: 2022-01-01 | Stop reason: SDUPTHER

## 2021-01-01 RX ORDER — NYSTATIN 100000 U/G
CREAM TOPICAL 2 TIMES DAILY
Qty: 120 G | Refills: 1 | Status: SHIPPED | OUTPATIENT
Start: 2021-01-01

## 2021-04-02 DIAGNOSIS — G47.00 INSOMNIA, UNSPECIFIED TYPE: ICD-10-CM

## 2021-04-02 NOTE — TELEPHONE ENCOUNTER
Patient never got this Rx last time it was prescribed because it did not [facility], it printed instead for you to sign. Can you sign it again so that it goes electronically to the pharmacy?

## 2021-04-05 ENCOUNTER — VIRTUAL VISIT (OUTPATIENT)
Dept: FAMILY MEDICINE CLINIC | Age: 76
End: 2021-04-05
Payer: MEDICARE

## 2021-04-05 DIAGNOSIS — Z00.00 MEDICARE ANNUAL WELLNESS VISIT, SUBSEQUENT: Primary | ICD-10-CM

## 2021-04-05 DIAGNOSIS — I10 ESSENTIAL HYPERTENSION: ICD-10-CM

## 2021-04-05 DIAGNOSIS — E78.2 MIXED HYPERLIPIDEMIA: ICD-10-CM

## 2021-04-05 DIAGNOSIS — G47.33 OSA (OBSTRUCTIVE SLEEP APNEA): ICD-10-CM

## 2021-04-05 DIAGNOSIS — E11.9 CONTROLLED TYPE 2 DIABETES MELLITUS WITHOUT COMPLICATION, WITHOUT LONG-TERM CURRENT USE OF INSULIN (HCC): ICD-10-CM

## 2021-04-05 DIAGNOSIS — E55.9 VITAMIN D DEFICIENCY: ICD-10-CM

## 2021-04-05 DIAGNOSIS — E53.8 VITAMIN B12 DEFICIENCY: ICD-10-CM

## 2021-04-05 DIAGNOSIS — G47.00 INSOMNIA, UNSPECIFIED TYPE: ICD-10-CM

## 2021-04-05 PROCEDURE — 3046F HEMOGLOBIN A1C LEVEL >9.0%: CPT | Performed by: NURSE PRACTITIONER

## 2021-04-05 PROCEDURE — G8427 DOCREV CUR MEDS BY ELIG CLIN: HCPCS | Performed by: NURSE PRACTITIONER

## 2021-04-05 PROCEDURE — 3017F COLORECTAL CA SCREEN DOC REV: CPT | Performed by: NURSE PRACTITIONER

## 2021-04-05 PROCEDURE — 2022F DILAT RTA XM EVC RTNOPTHY: CPT | Performed by: NURSE PRACTITIONER

## 2021-04-05 PROCEDURE — 3288F FALL RISK ASSESSMENT DOCD: CPT | Performed by: NURSE PRACTITIONER

## 2021-04-05 PROCEDURE — 1100F PTFALLS ASSESS-DOCD GE2>/YR: CPT | Performed by: NURSE PRACTITIONER

## 2021-04-05 PROCEDURE — G8756 NO BP MEASURE DOC: HCPCS | Performed by: NURSE PRACTITIONER

## 2021-04-05 PROCEDURE — 99443 PR PHYS/QHP TELEPHONE EVALUATION 21-30 MIN: CPT | Performed by: NURSE PRACTITIONER

## 2021-04-05 PROCEDURE — G8432 DEP SCR NOT DOC, RNG: HCPCS | Performed by: NURSE PRACTITIONER

## 2021-04-05 PROCEDURE — G0439 PPPS, SUBSEQ VISIT: HCPCS | Performed by: NURSE PRACTITIONER

## 2021-04-05 RX ORDER — MODAFINIL 200 MG/1
200 TABLET ORAL DAILY
Qty: 90 TAB | Refills: 1 | Status: CANCELLED | OUTPATIENT
Start: 2021-04-05

## 2021-04-05 RX ORDER — MODAFINIL 200 MG/1
200 TABLET ORAL DAILY
Qty: 90 TAB | Refills: 1 | Status: SHIPPED | OUTPATIENT
Start: 2021-04-05 | End: 2021-01-01 | Stop reason: SDUPTHER

## 2021-04-05 NOTE — PROGRESS NOTES
Mariela Morris presents today for   Chief Complaint   Patient presents with    Annual Wellness Visit       Virtual/telephone visit    Depression Screening:  3 most recent PHQ Screens 4/5/2021   Little interest or pleasure in doing things Not at all   Feeling down, depressed, irritable, or hopeless Not at all   Total Score PHQ 2 0       Learning Assessment:  Learning Assessment 9/24/2020   PRIMARY LEARNER Patient   HIGHEST LEVEL OF EDUCATION - PRIMARY LEARNER  SOME COLLEGE   BARRIERS PRIMARY LEARNER NONE   CO-LEARNER CAREGIVER -   PRIMARY LANGUAGE ENGLISH   LEARNER PREFERENCE PRIMARY READING   ANSWERED BY patient   RELATIONSHIP SELF       Fall Risk  Fall Risk Assessment, last 12 mths 4/5/2021   Able to walk? Yes   Fall in past 12 months? 0   Do you feel unsteady? 0   Are you worried about falling 0   Number of falls in past 12 months -   Fall with injury? -       ADL  ADL Assessment 4/5/2021   Feeding yourself No Help Needed   Getting from bed to chair No Help Needed   Getting dressed No Help Needed   Bathing or showering No Help Needed   Walk across the room (includes cane/walker) No Help Needed   Using the telphone No Help Needed   Taking your medications No Help Needed   Preparing meals No Help Needed   Managing money (expenses/bills) No Help Needed   Moderately strenuous housework (laundry) No Help Needed   Shopping for personal items (toiletries/medicines) No Help Needed   Shopping for groceries No Help Needed   Driving No Help Needed   Climbing a flight of stairs No Help Needed   Getting to places beyond walking distances No Help Needed       Travel Screening:    Travel Screening     Question   Response    In the last month, have you been in contact with someone who was confirmed or suspected to have Coronavirus / COVID-19? No / Unsure    Have you had a COVID-19 viral test in the last 14 days? No    Do you have any of the following new or worsening symptoms?       Have you traveled internationally or domestically in the last month? No      Travel History   Travel since 03/05/21     No documented travel since 03/05/21          Health Maintenance reviewed and discussed and ordered per Provider. Health Maintenance Due   Topic Date Due    Foot Exam Q1  Never done    Eye Exam Retinal or Dilated  Never done    COVID-19 Vaccine (1) Never done    Shingrix Vaccine Age 50> (1 of 2) Never done   . Coordination of Care:  1. Have you been to the ER, urgent care clinic since your last visit? Hospitalized since your last visit? no    2. Have you seen or consulted any other health care providers outside of the [address] [place] [address] since your last visit? Include any pap smears or colon screening.  no

## 2021-04-05 NOTE — PATIENT INSTRUCTIONS
Medicare Wellness Visit, Male The best way to live healthy is to have a lifestyle where you eat a well-balanced diet, exercise regularly, limit alcohol use, and quit all forms of tobacco/nicotine, if applicable. Regular preventive services are another way to keep healthy. Preventive services (vaccines, screening tests, monitoring & exams) can help personalize your care plan, which helps you manage your own care. Screening tests can find health problems at the earliest stages, when they are easiest to treat. [facility] follows the current, evidence-based guidelines published by the [facility] ([facility]) when recommending preventive services for our patients. Because we follow these guidelines, sometimes recommendations change over time as research supports it. (For example, a prostate screening blood test is no longer routinely recommended for men with no symptoms). Of course, you and your doctor may decide to screen more often for some diseases, based on your risk and co-morbidities (chronic disease you are already diagnosed with). Preventive services for you include: - Medicare offers their members a free annual wellness visit, which is time for you and your primary care provider to discuss and plan for your preventive service needs. Take advantage of this benefit every year! 
-All adults over age 72 should receive the recommended pneumonia vaccines. Current USPSTF guidelines recommend a series of two vaccines for the best pneumonia protection.  
-All adults should have a flu vaccine yearly and tetanus vaccine every 10 years. 
-All adults age 48 and older should receive the shingles vaccines (series of two vaccines).       
-All adults age 38-68 who are overweight should have a diabetes screening test once every three years.  
-Other screening tests & preventive services for persons with diabetes include: an eye exam to screen for diabetic retinopathy, a kidney function test, a foot exam, and stricter control over your cholesterol.  
-Cardiovascular screening for adults with routine risk involves an electrocardiogram (ECG) at intervals determined by the provider.  
-Colorectal cancer screening should be done for adults age 54-65 with no increased risk factors for colorectal cancer. There are a number of acceptable methods of screening for this type of cancer. Each test has its own benefits and drawbacks. Discuss with your provider what is most appropriate for you during your annual wellness visit. The different tests include: colonoscopy (considered the best screening method), a fecal occult blood test, a fecal DNA test, and sigmoidoscopy. 
-All adults born between [facility] should be screened once for Hepatitis C. 
-An Abdominal Aortic Aneurysm (AAA) Screening is recommended for men age 73-68 who has ever smoked in their lifetime. Here is a list of your current Health Maintenance items (your personalized list of preventive services) with a due date: 
Health Maintenance Due Topic Date Due  
 Diabetic Foot Care  Never done  Eye Exam  Never done  COVID-19 Vaccine (1) Never done  Shingles Vaccine (1 of 2) Never done

## 2021-04-05 NOTE — PROGRESS NOTES
This is the Subsequent Medicare Annual Wellness Exam, performed 12 months or more after the Initial AWV or the last Subsequent AWV    I have reviewed the patient's medical history in detail and updated the computerized patient record. 1. Medicare annual wellness visit, subsequent       Depression Risk Factor Screening     3 most recent PHQ Screens 4/5/2021   Little interest or pleasure in doing things Not at all   Feeling down, depressed, irritable, or hopeless Not at all   Total Score PHQ 2 0       Alcohol Risk Screen    Do you average more than 1 drink per night or more than 7 drinks a week: A beer once in a while. In the past three months have you have had more than 4 drinks containing alcohol on one occasion: No      Functional Ability and Level of Safety    Hearing: Hearing is good. with hearing aids. Activities of Daily Living: The home contains: no safety equipment. Patient does total self care      Ambulation: with no difficulty     Fall Risk:  Fall Risk Assessment, last 12 mths 4/5/2021   Able to walk? Yes   Fall in past 12 months? 0   Do you feel unsteady? 0   Are you worried about falling 0   Number of falls in past 12 months -   Fall with injury?  -      Abuse Screen:  Patient is not abused       Cognitive Screening    Has your family/caregiver stated any concerns about your memory: no     Cognitive Screening: Normal - Verbal with three words    Health Maintenance Due     Health Maintenance Due   Topic Date Due    Foot Exam Q1  Never done    Eye Exam Retinal or Dilated  Never done    COVID-19 Vaccine (1) Never done    Shingrix Vaccine Age 50> (1 of 2) Never done       Patient Care Team   Patient Care Team:  Joao Gonzalez NP as PCP - General (Nurse Practitioner)  Joao Gonzalez NP as PCP - [facility] Empaneled Provider  Melodie Suero 26 (Podiatry)  [address], Demario Poplin, OD (Optometry)    History     Patient Active Problem List   Diagnosis Code    Essential hypertension I10    Mixed hyperlipidemia E78.2    Diabetes mellitus type 2, controlled ([facility] [address] E11.9     Past Medical History:   Diagnosis Date    Diabetes mellitus type 2, controlled ([facility] [address] 8/10/2020    Essential hypertension 8/10/2020    Mixed hyperlipidemia 8/10/2020      Past Surgical History:   Procedure Laterality Date    HX IMPLANTABLE CARDIOVERTER DEFIBRILLATOR  02/22/2021     Current Outpatient Medications   Medication Sig Dispense Refill    modafiniL (PROVIGIL) 200 mg tablet Take 1 Tab by mouth daily. Max Daily Amount: 200 mg. 90 Tab 1    albuterol (PROVENTIL HFA, VENTOLIN HFA, PROAIR HFA) 90 mcg/actuation inhaler Take 1 Puff by inhalation every four (4) hours as needed for Wheezing. 3 Inhaler 1    SITagliptin (Januvia) 100 mg tablet Take 1 Tab by mouth daily. 90 Tab 0    amiodarone (CORDARONE) 200 mg tablet Take 200 mg by mouth daily.  aspirin delayed-release 81 mg tablet Take  by mouth daily.  atorvastatin (LIPITOR) 20 mg tablet Take  by mouth daily.  brimonidine (ALPHAGAN) 0.2 % ophthalmic solution Administer 1 Drop to both eyes three (3) times daily.  carvediloL (COREG) 12.5 mg tablet Take 12.5 mg by mouth two (2) times daily (with meals).  desloratadine (CLARINEX) 5 mg tablet Take 5 mg by mouth.  docusate sodium (COLACE) 100 mg capsule Take 100 mg by mouth two (2) times a day.  apixaban (Eliquis) 5 mg tablet Take 5 mg by mouth two (2) times a day.  sacubitriL-valsartan (Entresto) 24-26 mg tablet Take 1 Tab by mouth two (2) times a day.  furosemide (LASIX) 20 mg tablet Take 20 mg by mouth daily. Monday, Wednesday, Friday's, 1/2 tablet      latanoprost (XALATAN) 0.005 % ophthalmic solution Administer 1 Drop to both eyes nightly.  metoprolol tartrate (LOPRESSOR) 50 mg tablet Take  by mouth two (2) times a day.  nitroglycerin (Nitrostat) 0.4 mg SL tablet 0.4 mg by SubLINGual route every five (5) minutes as needed for Chest Pain. Up to 3 doses.       spironolactone (ALDACTONE) 25 mg tablet Take  by mouth daily.  timolol (TIMOPTIC) 0.5 % ophthalmic solution 1 Drop two (2) times a day.  metFORMIN (GLUCOPHAGE) 500 mg tablet TAKE 2 TABLETS TWICE A DAY WITH MEALS (Patient taking differently: 500 mg. 1 tablet in the morning and 1 tablet at night) 360 Tab 3    ergocalciferol (Vitamin D2) 1,250 mcg (50,000 unit) capsule Take 50,000 Units by mouth.  FLUTICASONE PROPIONATE NA by Nasal route.  lisinopriL (PRINIVIL, ZESTRIL) 20 mg tablet Take  by mouth daily. No Known Allergies    History reviewed. No pertinent family history. Social History     Tobacco Use    Smoking status: Former Smoker    Smokeless tobacco: Never Used   Substance Use Topics    Alcohol use: Never     Frequency: Never           Assessment/Plan   Education and counseling provided:  Are appropriate based on today's review and evaluation        Yeimi Webb, who was evaluated through a synchronous (real-time) audio only encounter, and/or his healthcare decision maker, is aware that it is a billable service, with coverage as determined by his insurance carrier. He provided verbal consent to proceed: Yes, and patient identification was verified. It was conducted pursuant to the emergency declaration under the [facility], [address] [place] [address] authority and the [employer] Act. A caregiver was present when appropriate. Ability to conduct physical exam was limited. I was in the office. The patient was at home.

## 2021-04-06 NOTE — PROGRESS NOTES
Pursuant to the emergency declaration under the [facility] waiver authority and the [employer] and [employer] Act, this phone visit was conducted, with patient's consent, to reduce the patient's risk of exposure to COVID-19 and provide continuity of care for an established patient. He and/or health care decision maker is aware that that he may receive a bill for this telephone service, depending on his insurance coverage, and has provided verbal consent to proceed. This is a Patient Initiated Episode with an Established Patient who has not had a related appointment within my department in the past 7 days or scheduled within the next 24 hours. HISTORY OF PRESENTING ILLNESS      Allison Bagley is a 76 y.o. male evaluated via telephone on 4/5/2021 due to COVID 19 restrictions. Patient unable to participate in Virtual Visit with synchronous audio/visual technology. Patient presents today for Medicare wellness and routine follow-up    PCP Provider  Bev Ya NP  Past Medical History:   Diagnosis Date    Diabetes mellitus type 2, controlled ([address] 8/10/2020    Essential hypertension 8/10/2020    Mixed hyperlipidemia 8/10/2020      Past Surgical History:   Procedure Laterality Date    HX IMPLANTABLE CARDIOVERTER DEFIBRILLATOR  02/22/2021     No Known Allergies   History reviewed. No pertinent family history. Current Outpatient Medications   Medication Sig    modafiniL (PROVIGIL) 200 mg tablet Take 1 Tab by mouth daily. Max Daily Amount: 200 mg.    albuterol (PROVENTIL HFA, VENTOLIN HFA, PROAIR HFA) 90 mcg/actuation inhaler Take 1 Puff by inhalation every four (4) hours as needed for Wheezing.  SITagliptin (Januvia) 100 mg tablet Take 1 Tab by mouth daily.  amiodarone (CORDARONE) 200 mg tablet Take 200 mg by mouth daily.     aspirin delayed-release 81 mg tablet Take by mouth daily.  atorvastatin (LIPITOR) 20 mg tablet Take  by mouth daily.  brimonidine (ALPHAGAN) 0.2 % ophthalmic solution Administer 1 Drop to both eyes three (3) times daily.  carvediloL (COREG) 12.5 mg tablet Take 12.5 mg by mouth two (2) times daily (with meals).  desloratadine (CLARINEX) 5 mg tablet Take 5 mg by mouth.  docusate sodium (COLACE) 100 mg capsule Take 100 mg by mouth two (2) times a day.  apixaban (Eliquis) 5 mg tablet Take 5 mg by mouth two (2) times a day.  sacubitriL-valsartan (Entresto) 24-26 mg tablet Take 1 Tab by mouth two (2) times a day.  furosemide (LASIX) 20 mg tablet Take 20 mg by mouth daily. Monday, Wednesday, Friday's, 1/2 tablet    latanoprost (XALATAN) 0.005 % ophthalmic solution Administer 1 Drop to both eyes nightly.  metoprolol tartrate (LOPRESSOR) 50 mg tablet Take  by mouth two (2) times a day.  nitroglycerin (Nitrostat) 0.4 mg SL tablet 0.4 mg by SubLINGual route every five (5) minutes as needed for Chest Pain. Up to 3 doses.  spironolactone (ALDACTONE) 25 mg tablet Take  by mouth daily.  timolol (TIMOPTIC) 0.5 % ophthalmic solution 1 Drop two (2) times a day.  metFORMIN (GLUCOPHAGE) 500 mg tablet TAKE 2 TABLETS TWICE A DAY WITH MEALS (Patient taking differently: 500 mg. 1 tablet in the morning and 1 tablet at night)    ergocalciferol (Vitamin D2) 1,250 mcg (50,000 unit) capsule Take 50,000 Units by mouth.  FLUTICASONE PROPIONATE NA by Nasal route.  lisinopriL (PRINIVIL, ZESTRIL) 20 mg tablet Take  by mouth daily. No current facility-administered medications for this visit. There were no vitals filed for this visit.   Social History     Socioeconomic History    Marital status:      Spouse name: Not on file    Number of children: Not on file    Years of education: Not on file    Highest education level: Not on file   Occupational History    Not on file   Social Needs    Financial resource strain: Not on file    Food insecurity     Worry: Not on file     Inability: Not on file    Transportation needs     Medical: Not on file     Non-medical: Not on file   Tobacco Use    Smoking status: Former Smoker    Smokeless tobacco: Never Used   Substance and Sexual Activity    Alcohol use: Never     Frequency: Never    Drug use: Not on file    Sexual activity: Not on file   Lifestyle    Physical activity     Days per week: Not on file     Minutes per session: Not on file    Stress: Not on file   Relationships    Social connections     Talks on phone: Not on file     Gets together: Not on file     Attends Holiness service: Not on file     Active member of club or organization: Not on file     Attends meetings of clubs or organizations: Not on file     Relationship status: Not on file    Intimate partner violence     Fear of current or ex partner: Not on file     Emotionally abused: Not on file     Physically abused: Not on file     Forced sexual activity: Not on file   Other Topics Concern    Not on file   Social History Narrative    Not on file       MEDICATIONS     Current Outpatient Medications   Medication Sig    modafiniL (PROVIGIL) 200 mg tablet Take 1 Tab by mouth daily. Max Daily Amount: 200 mg.    albuterol (PROVENTIL HFA, VENTOLIN HFA, PROAIR HFA) 90 mcg/actuation inhaler Take 1 Puff by inhalation every four (4) hours as needed for Wheezing.  SITagliptin (Januvia) 100 mg tablet Take 1 Tab by mouth daily.  amiodarone (CORDARONE) 200 mg tablet Take 200 mg by mouth daily.  aspirin delayed-release 81 mg tablet Take  by mouth daily.  atorvastatin (LIPITOR) 20 mg tablet Take  by mouth daily.  brimonidine (ALPHAGAN) 0.2 % ophthalmic solution Administer 1 Drop to both eyes three (3) times daily.  carvediloL (COREG) 12.5 mg tablet Take 12.5 mg by mouth two (2) times daily (with meals).  desloratadine (CLARINEX) 5 mg tablet Take 5 mg by mouth.     docusate sodium (COLACE) 100 mg capsule Take 100 mg by mouth two (2) times a day.  apixaban (Eliquis) 5 mg tablet Take 5 mg by mouth two (2) times a day.  sacubitriL-valsartan (Entresto) 24-26 mg tablet Take 1 Tab by mouth two (2) times a day.  furosemide (LASIX) 20 mg tablet Take 20 mg by mouth daily. Monday, Wednesday, Friday's, 1/2 tablet    latanoprost (XALATAN) 0.005 % ophthalmic solution Administer 1 Drop to both eyes nightly.  metoprolol tartrate (LOPRESSOR) 50 mg tablet Take  by mouth two (2) times a day.  nitroglycerin (Nitrostat) 0.4 mg SL tablet 0.4 mg by SubLINGual route every five (5) minutes as needed for Chest Pain. Up to 3 doses.  spironolactone (ALDACTONE) 25 mg tablet Take  by mouth daily.  timolol (TIMOPTIC) 0.5 % ophthalmic solution 1 Drop two (2) times a day.  metFORMIN (GLUCOPHAGE) 500 mg tablet TAKE 2 TABLETS TWICE A DAY WITH MEALS (Patient taking differently: 500 mg. 1 tablet in the morning and 1 tablet at night)    ergocalciferol (Vitamin D2) 1,250 mcg (50,000 unit) capsule Take 50,000 Units by mouth.  FLUTICASONE PROPIONATE NA by Nasal route.  lisinopriL (PRINIVIL, ZESTRIL) 20 mg tablet Take  by mouth daily. No current facility-administered medications for this visit. I have reviewed the nurses notes, vitals, problem list, allergy list, medical history, family, social history and medications. REVIEW OF SYMPTOMS     General: Pt denies excessive weight gain or loss. Pt is able to conduct ADL's  HEENT: Denies blurred vision, headaches, hearing loss, epistaxis and difficulty swallowing. Respiratory: Denies cough, congestion, shortness of breath, ARCHER, wheezing or stridor.   Cardiovascular: Denies precordial pain, palpitations, edema or PND  Gastrointestinal: Denies poor appetite, indigestion, abdominal pain or blood in stool  Genitourinary: Denies hematuria, dysuria, increased urinary frequency  Musculoskeletal: Denies joint pain or swelling from muscles or joints  Neurologic: Denies tremor, paresthesias, headache, or sensory motor disturbance  Psychiatric: Denies confusion, insomnia, depression  Integumentray: Denies rash, itching or ulcers. Hematologic: Denies easy bruising, bleeding       PHYSICAL EXAM       Due to this being a telephone encounter a very limited exam was performed  Neurological: A&Ox3, no slurred speech, answering questions appropriately  Respiratory: Non labored, talking in complete sentences, no audible wheeze over the phone        ASSESSMENT        Diagnoses and all orders for this visit:    1. Medicare annual wellness visit, subsequent    2. Insomnia, unspecified type  -     modafiniL (PROVIGIL) 200 mg tablet; Take 1 Tab by mouth daily. Max Daily Amount: 200 mg.    3. Controlled type 2 diabetes mellitus without complication, without long-term current use of insulin ([facility] [address]    4. Essential hypertension    5. Mixed hyperlipidemia    6. Vitamin B12 deficiency    7. Vitamin D deficiency    8. GET (obstructive sleep apnea)    Patient will continue current medication regimen for now he will follow-up in office in 3 months for routine labs and follow-up    ICD-10-CM ICD-9-CM    1. Medicare annual wellness visit, subsequent  Z00.00 V70.0    2. Insomnia, unspecified type  G47.00 780.52 modafiniL (PROVIGIL) 200 mg tablet   3. Controlled type 2 diabetes mellitus without complication, without long-term current use of insulin ([facility])  E11.9 250.00    4. Essential hypertension  I10 401.9    5. Mixed hyperlipidemia  E78.2 272.2    6. Vitamin B12 deficiency  E53.8 266.2    7. Vitamin D deficiency  E55.9 268.9    8. GET (obstructive sleep apnea)  G47.33 327.23      Orders Placed This Encounter    modafiniL (PROVIGIL) 200 mg tablet     Sig: Take 1 Tab by mouth daily. Max Daily Amount: 200 mg. Dispense:  90 Tab     Refill:  1        PLAN       TIME 21 (Minutes) SPENT RELATED TO THIS PHONE ENCOUNTER    We discussed the expected course, resolution and complications of the diagnosis(es) in detail. Medication risks, benefits, costs, interactions, and alternatives were discussed as indicated. I advised him to contact the office if his condition worsens, changes or fails to improve as anticipated.  He expressed understanding with the diagnosis(es) and plan    tcg

## 2021-10-29 NOTE — PROGRESS NOTES
Manuel Harvey is a 68 y.o.male presents with   Chief Complaint   Patient presents with    Follow-up     routine 6 month     Rash     under left arm        72-year-old male presents today in office for routine follow-up. 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 Today he complains of red excoriated rash to left axilla which she states he has noticed over the past several days. Subjective:           Past Medical History:   Diagnosis Date    Diabetes mellitus type 2, controlled ([address] 8/10/2020    Essential hypertension 8/10/2020    Mixed hyperlipidemia 8/10/2020     Past Surgical History:   Procedure Laterality Date    HX IMPLANTABLE CARDIOVERTER DEFIBRILLATOR  02/22/2021     Social History     Socioeconomic History    Marital status:      Spouse name: Not on file    Number of children: Not on file    Years of education: Not on file    Highest education level: Not on file   Tobacco Use    Smoking status: Former Smoker    Smokeless tobacco: Never Used   Vaping Use    Vaping Use: Never used   Substance and Sexual Activity    Alcohol use: Never     Social Determinants of Health     Financial Resource Strain:     Difficulty of Paying Living Expenses:    Food Insecurity:     Worried About Running Out of Food in the Last Year:     [address] in the Last Year:    Transportation Needs:     Lack of Transportation (Medical):      Lack of Transportation (Non-Medical):    Physical Activity:     Days of Exercise per Week:     Minutes of Exercise per Session:    Stress:     Feeling of Stress :    Social Connections:     Frequency of Communication with Friends and Family:     Frequency of Social Gatherings with Friends and Family:     Attends [employer] Services:     Active Member of Clubs or Organizations:     Attends Club or Organization Meetings:     Marital Status:      Current Outpatient Medications   Medication Sig Dispense Refill    apixaban (Eliquis) 5 mg tablet Take 1 Tablet by mouth two (2) times a day. 180 Tablet 1    spironolactone (ALDACTONE) 25 mg tablet Take 1 Tablet by mouth daily. 90 Tablet 1    sacubitriL-valsartan (Entresto) 24-26 mg tablet Take 1 Tablet by mouth two (2) times a day. 180 Tablet 1    modafiniL (PROVIGIL) 200 mg tablet Take 1 Tablet by mouth daily. Max Daily Amount: 200 mg. 90 Tablet 1    albuterol (PROVENTIL HFA, VENTOLIN HFA, PROAIR HFA) 90 mcg/actuation inhaler Take 1 Puff by inhalation every four (4) hours as needed for Wheezing. 3 Inhaler 1    metFORMIN (GLUCOPHAGE) 500 mg tablet TAKE 2 TABLETS TWICE A DAY WITH MEALS (Patient taking differently: 500 mg. 1 tablet in the morning and 1 tablet at night) 360 Tab 3    SITagliptin (Januvia) 100 mg tablet Take 1 Tab by mouth daily. 90 Tab 0    amiodarone (CORDARONE) 200 mg tablet Take 200 mg by mouth daily.  aspirin delayed-release 81 mg tablet Take  by mouth daily.  atorvastatin (LIPITOR) 20 mg tablet Take  by mouth daily.  brimonidine (ALPHAGAN) 0.2 % ophthalmic solution Administer 1 Drop to both eyes three (3) times daily.  carvediloL (COREG) 12.5 mg tablet Take 12.5 mg by mouth two (2) times daily (with meals).  desloratadine (CLARINEX) 5 mg tablet Take 5 mg by mouth.  docusate sodium (COLACE) 100 mg capsule Take 100 mg by mouth two (2) times a day.  furosemide (LASIX) 20 mg tablet Take 20 mg by mouth daily. Monday, Wednesday, Friday's, 1/2 tablet      latanoprost (XALATAN) 0.005 % ophthalmic solution Administer 1 Drop to both eyes nightly.  lisinopriL (PRINIVIL, ZESTRIL) 20 mg tablet Take  by mouth daily.  metoprolol tartrate (LOPRESSOR) 50 mg tablet Take  by mouth two (2) times a day.  nitroglycerin (Nitrostat) 0.4 mg SL tablet 0.4 mg by SubLINGual route every five (5) minutes as needed for Chest Pain. Up to 3 doses.  timolol (TIMOPTIC) 0.5 % ophthalmic solution 1 Drop two (2) times a day.       ergocalciferol (Vitamin D2) 1,250 mcg (50,000 unit) capsule Take 50,000 Units by mouth. (Patient not taking: Reported on 10/29/2021)      FLUTICASONE PROPIONATE NA by Nasal route. (Patient not taking: Reported on 10/29/2021)       No Known Allergies  The patient has a family history of    REVIEW OF SYSTEMS  Review of Systems   Constitutional: Negative for chills and fever. HENT: Negative for ear discharge, ear pain, hearing loss, sinus pain and sore throat. Eyes: Negative for pain. Respiratory: Negative for cough and shortness of breath. Cardiovascular: Negative for chest pain, palpitations and leg swelling. Gastrointestinal: Negative for abdominal pain, nausea and vomiting. Genitourinary: Negative for dysuria, frequency and urgency. Musculoskeletal: Negative for falls, myalgias and neck pain. Skin: Positive for itching and rash. Neurological: Negative for dizziness, tingling, tremors and headaches. Psychiatric/Behavioral: Negative for depression, substance abuse and suicidal ideas. The patient is not nervous/anxious and does not have insomnia.         Objective:     Visit Vitals  /72 (BP 1 Location: Left upper arm, BP Patient Position: Sitting, BP Cuff Size: Adult)   Pulse 80   Temp 97.5 °F (36.4 °C) (Temporal)   Ht 5' 8\" (1.727 m)   Wt 208 lb 9.6 oz (94.6 kg)   SpO2 91%   BMI 31.72 kg/m²       Current Outpatient Medications   Medication Instructions    albuterol (PROVENTIL HFA, VENTOLIN HFA, PROAIR HFA) 90 mcg/actuation inhaler 1 Puff, Inhalation, EVERY 4 HOURS AS NEEDED    amiodarone (CORDARONE) 200 mg, Oral, DAILY    apixaban (ELIQUIS) 5 mg, Oral, 2 TIMES DAILY    aspirin delayed-release 81 mg tablet Oral, DAILY    atorvastatin (LIPITOR) 20 mg tablet Oral, DAILY    brimonidine (ALPHAGAN) 0.2 % ophthalmic solution 1 Drop, Both Eyes, 3 TIMES DAILY    carvediloL (COREG) 12.5 mg, Oral, 2 TIMES DAILY WITH MEALS    desloratadine (CLARINEX) 5 mg, Oral    docusate sodium (COLACE) 100 mg, Oral, 2 TIMES DAILY  ergocalciferol (VITAMIN D2) 50,000 Units    FLUTICASONE PROPIONATE NA by Nasal route.  furosemide (LASIX) 20 mg, Oral, DAILY, Monday, Wednesday, Friday's, 1/2 tablet    latanoprost (XALATAN) 0.005 % ophthalmic solution 1 Drop, Both Eyes, EVERY BEDTIME    lisinopriL (PRINIVIL, ZESTRIL) 20 mg tablet Oral, DAILY    metFORMIN (GLUCOPHAGE) 500 mg tablet TAKE 2 TABLETS TWICE A DAY WITH MEALS    metoprolol tartrate (LOPRESSOR) 50 mg tablet Oral, 2 TIMES DAILY    modafiniL (PROVIGIL) 200 mg, Oral, DAILY    nitroglycerin (NITROSTAT) 0.4 mg, SubLINGual, EVERY 5 MIN AS NEEDED, Up to 3 doses.  sacubitriL-valsartan (Entresto) 24-26 mg tablet 1 Tablet, Oral, 2 TIMES DAILY    SITagliptin (JANUVIA) 100 mg, Oral, DAILY    spironolactone (ALDACTONE) 25 mg, Oral, DAILY    timolol (TIMOPTIC) 0.5 % ophthalmic solution 1 Drop, 2 TIMES DAILY        PHYSICAL EXAM  Physical Exam  Constitutional:       Appearance: Normal appearance. Cardiovascular:      Heart sounds: Normal heart sounds. Pulmonary:      Breath sounds: Normal breath sounds. Musculoskeletal:      Right lower leg: No edema. Left lower leg: No edema. Lymphadenopathy:      Cervical: No cervical adenopathy. Skin:     General: Skin is warm and dry. Findings: Rash (Erythematous excoriations left axilla) present. Neurological:      Mental Status: He is alert and oriented to person, place, and time. Psychiatric:         Mood and Affect: Mood normal.         Behavior: Behavior normal.         Thought Content: Thought content normal.         Judgment: Judgment normal.         Assessment/Plan:     Diagnoses and all orders for this visit:    1. Needs flu shot  -     FLU (FLUAD QUAD INFLUENZA VACCINE,QUAD,ADJUVANTED)    2. Essential hypertension  -     TSH 3RD GENERATION  -     METABOLIC PANEL, COMPREHENSIVE  -     CBC W/O DIFF    3.  Controlled type 2 diabetes mellitus without complication, without long-term current use of insulin ([facility] [address]  - MICROALBUMIN, UR, RAND W/ MICROALB/CREAT RATIO; Future  -     HEMOGLOBIN A1C W/O EAG    4. Mixed hyperlipidemia  -     LIPID PANEL    5. Vitamin B12 deficiency  -     VITAMIN B12    6. Vitamin D deficiency  -     VITAMIN D, 25 HYDROXY    7. Screening for malignant neoplasm of prostate  -     PSA SCREENING (SCREENING)    8. Insomnia, unspecified type  -     modafiniL (PROVIGIL) 200 mg tablet; Take 1 Tablet by mouth daily. Max Daily Amount: 200 mg.    9. GET (obstructive sleep apnea)  -     REFERRAL TO PULMONARY DISEASE    Other orders  -     apixaban (Eliquis) 5 mg tablet; Take 1 Tablet by mouth two (2) times a day. -     spironolactone (ALDACTONE) 25 mg tablet; Take 1 Tablet by mouth daily. -     sacubitriL-valsartan (Entresto) 24-26 mg tablet; Take 1 Tablet by mouth two (2) times a day. Follow-up and Dispositions    · Return in about 6 months (around 4/29/2022). Disclaimer:    I have discussed the diagnosis with the patient and the intended plan as seen above. The patient understands our medical plan. The risks, benefits and significant side effects of all medications have been reviewed. Anticipated time course and progression of condition reviewed. All questions have been addressed. He received an after visit summary, with information reviewed, and questions answered. Where appropriate, he is instructed to call the clinic if he has not been notified either by phone or through [address] with the results of his tests or with an appointment plan for any referrals within 1 week(s). The patient  is to call if his condition worsens or fails to improve or if significant side effects are experienced.        Daxa Toure NP

## 2021-10-29 NOTE — PROGRESS NOTES
Bridgette Shah presents today for   Chief Complaint   Patient presents with    Follow-up     routine 6 month     Rash     under left arm        Is someone accompanying this pt? no    Is the patient using any DME equipment during OV? no    Depression Screening:  3 most recent PHQ Screens 10/29/2021   Little interest or pleasure in doing things Not at all   Feeling down, depressed, irritable, or hopeless Not at all   Total Score PHQ 2 0       Learning Assessment:  Learning Assessment 9/24/2020   PRIMARY LEARNER Patient   HIGHEST LEVEL OF EDUCATION - PRIMARY LEARNER  SOME COLLEGE   BARRIERS PRIMARY LEARNER NONE   CO-LEARNER CAREGIVER -   PRIMARY LANGUAGE ENGLISH   LEARNER PREFERENCE PRIMARY READING   ANSWERED BY patient   RELATIONSHIP SELF       Fall Risk  Fall Risk Assessment, last 12 mths 4/5/2021   Able to walk? Yes   Fall in past 12 months? 0   Do you feel unsteady? 0   Are you worried about falling 0   Number of falls in past 12 months -   Fall with injury? -       Health Maintenance reviewed and discussed and ordered per Provider. Health Maintenance Due   Topic Date Due    Eye Exam Retinal or Dilated  Never done    COVID-19 Vaccine (1) Never done    DTaP/Tdap/Td series (1 - Tdap) Never done    Shingrix Vaccine Age 50> (1 of 2) Never done    Flu Vaccine (1) 09/01/2021    MICROALBUMIN Q1  09/09/2021   . Coordination of Care:  1. Have you been to the ER, urgent care clinic since your last visit? Hospitalized since your last visit?  no    2. Have you seen or consulted any other health care providers outside of the [address] [place] [address] since your last visit? Include any pap smears or colon screening.  [facility]

## 2021-10-29 NOTE — PATIENT INSTRUCTIONS
Vaccine Information Statement    Influenza (Flu) Vaccine (Inactivated or Recombinant): What You Need to Know    Many vaccine information statements are available in [place] and other languages. See www.immunize.org/vis. Hojas de información sobre vacunas están disponibles en español y en muchos otros idiomas. Visite www.immunize.org/vis. 1. Why get vaccinated? Influenza vaccine can prevent influenza (flu). Flu is a contagious disease that spreads around the [facility] every year, usually between October and May. Anyone can get the flu, but it is more dangerous for some people. Infants and young children, people 72 years and older, pregnant people, and people with certain health conditions or a weakened immune system are at greatest risk of flu complications. Pneumonia, bronchitis, sinus infections, and ear infections are examples of flu-related complications. If you have a medical condition, such as heart disease, cancer, or diabetes, flu can make it worse. Flu can cause fever and chills, sore throat, muscle aches, fatigue, cough, headache, and runny or stuffy nose. Some people may have vomiting and diarrhea, though this is more common in children than adults. In an average year, thousands of people in the [facility] die from flu, and many more are hospitalized. Flu vaccine prevents millions of illnesses and flu-related visits to the doctor each year. 2. Influenza vaccines     CDC recommends everyone 6 months and older get vaccinated every flu season. Children 6 months through 6years of age may need 2 doses during a single flu season. Everyone else needs only 1 dose each flu season. It takes about 2 weeks for protection to develop after vaccination. There are many flu viruses, and they are always changing. Each year a new flu vaccine is made to protect against the influenza viruses believed to be likely to cause disease in the upcoming flu season.  Even when the vaccine doesnt exactly match these viruses, it may still provide some protection. Influenza vaccine does not cause flu. Influenza vaccine may be given at the same time as other vaccines. 3. Talk with your health care provider    Tell your vaccination provider if the person getting the vaccine:   Has had an allergic reaction after a previous dose of influenza vaccine, or has any severe, life-threatening allergies    Has ever had Guillain-Barré Syndrome (also called GBS)    In some cases, your health care provider may decide to postpone influenza vaccination until a future visit. Influenza vaccine can be administered at any time during pregnancy. People who are or will be pregnant during influenza season should receive inactivated influenza vaccine. People with minor illnesses, such as a cold, may be vaccinated. People who are moderately or severely ill should usually wait until they recover before getting influenza vaccine. Your health care provider can give you more information. 4. Risks of a vaccine reaction     Soreness, redness, and swelling where the shot is given, fever, muscle aches, and headache can happen after influenza vaccination.  There may be a very small increased risk of Guillain-Barré Syndrome (GBS) after inactivated influenza vaccine (the flu shot). [place] children who get the flu shot along with pneumococcal vaccine (PCV13) and/or DTaP vaccine at the same time might be slightly more likely to have a seizure caused by fever. Tell your health care provider if a child who is getting flu vaccine has ever had a seizure. People sometimes faint after medical procedures, including vaccination. Tell your provider if you feel dizzy or have vision changes or ringing in the ears. As with any medicine, there is a very remote chance of a vaccine causing a severe allergic reaction, other serious injury, or death. 5. What if there is a serious problem?     An allergic reaction could occur after the vaccinated person leaves the clinic. If you see signs of a severe allergic reaction (hives, swelling of the face and throat, difficulty breathing, a fast heartbeat, dizziness, or weakness), call 9-1-1 and get the person to the nearest hospital.    For other signs that concern you, call your health care provider. Adverse reactions should be reported to the Vaccine Adverse Event Reporting System (VAERS). Your health care provider will usually file this report, or you can do it yourself. Visit the VAERS website at www.vaers. [facility].gov or call 4-238.397.3166. VAERS is only for reporting reactions, and VAERS staff members do not give medical advice. 6. The National Vaccine Injury Compensation Program    The [facility] Vaccine Injury Compensation Program (VICP) is a federal program that was created to compensate people who may have been injured by certain vaccines. Claims regarding alleged injury or death due to vaccination have a time limit for filing, which may be as short as two years. Visit the VICP website at www.[facility].gov/vaccinecompensation or call 9-695.163.8181 to learn about the program and about filing a claim. 7. How can I learn more?  Ask your health care provider.  Call your local or state health department.  Visit the website of the Food and Drug Administration (FDA) for vaccine package inserts and additional information at www.fda.gov/vaccines-blood-biologics/vaccines.  Contact the Centers for Disease Control and Prevention (CDC):  - Call 5-316.110.1771 (1-800-CDC-INFO) or  - Visit CDCs influenza website at www.cdc.gov/flu. Vaccine Information Statement   Inactivated Influenza Vaccine   8/6/2021  42 [address] [id number]   Department of Health and Human Services  Centers for Disease Control and Prevention    Office Use Only

## 2021-11-12 NOTE — TELEPHONE ENCOUNTER
From: Alexy Adames Hazard  To: Hong Reda NP  Sent: 11/11/2021 3:56 PM EST  Subject: Prescription Refills & Questions     [address]! Pallavi Connollyy, Need refills on Amiodatone (200mg) and Carvedilol (12.5mg). Also, any luck on the referral to a Pulmanologist? Also, whats the name of the cream for my armpit rash? Ryan Williamson gave me the number of [facility]. Im going to call them to see if they need a referral. Will see you on the 18th.   Skylar Del Castillo

## 2022-01-01 ENCOUNTER — TELEPHONE (OUTPATIENT)
Dept: FAMILY MEDICINE CLINIC | Age: 77
End: 2022-01-01

## 2022-01-01 ENCOUNTER — OFFICE VISIT (OUTPATIENT)
Dept: FAMILY MEDICINE CLINIC | Age: 77
End: 2022-01-01
Payer: MEDICARE

## 2022-01-01 ENCOUNTER — HOSPITAL ENCOUNTER (INPATIENT)
Age: 77
LOS: 1 days | Discharge: ANOTHER HOSPITAL | DRG: 871 | End: 2022-05-11
Attending: EMERGENCY MEDICINE | Admitting: INTERNAL MEDICINE
Payer: MEDICARE

## 2022-01-01 ENCOUNTER — HOSPITAL ENCOUNTER (OUTPATIENT)
Dept: LAB | Age: 77
Discharge: HOME | End: 2022-05-06
Payer: MEDICARE

## 2022-01-01 ENCOUNTER — APPOINTMENT (OUTPATIENT)
Dept: GENERAL RADIOLOGY | Age: 77
DRG: 871 | End: 2022-01-01
Attending: NURSE PRACTITIONER
Payer: MEDICARE

## 2022-01-01 ENCOUNTER — APPOINTMENT (OUTPATIENT)
Dept: GENERAL RADIOLOGY | Age: 77
DRG: 871 | End: 2022-01-01
Attending: EMERGENCY MEDICINE
Payer: MEDICARE

## 2022-01-01 VITALS
WEIGHT: 193.8 LBS | BODY MASS INDEX: 29.37 KG/M2 | HEIGHT: 68 IN | TEMPERATURE: 98.7 F | DIASTOLIC BLOOD PRESSURE: 62 MMHG | OXYGEN SATURATION: 95 % | HEART RATE: 63 BPM | RESPIRATION RATE: 28 BRPM | SYSTOLIC BLOOD PRESSURE: 98 MMHG

## 2022-01-01 VITALS
DIASTOLIC BLOOD PRESSURE: 68 MMHG | BODY MASS INDEX: 30.4 KG/M2 | WEIGHT: 200.6 LBS | SYSTOLIC BLOOD PRESSURE: 114 MMHG | HEART RATE: 59 BPM | OXYGEN SATURATION: 93 % | RESPIRATION RATE: 20 BRPM | HEIGHT: 68 IN | TEMPERATURE: 97.3 F

## 2022-01-01 DIAGNOSIS — I10 ESSENTIAL HYPERTENSION: ICD-10-CM

## 2022-01-01 DIAGNOSIS — J84.10 PULMONARY FIBROSIS (HCC): ICD-10-CM

## 2022-01-01 DIAGNOSIS — Z99.81 OXYGEN DEPENDENT: ICD-10-CM

## 2022-01-01 DIAGNOSIS — E11.9 CONTROLLED TYPE 2 DIABETES MELLITUS WITHOUT COMPLICATION, WITHOUT LONG-TERM CURRENT USE OF INSULIN (HCC): ICD-10-CM

## 2022-01-01 DIAGNOSIS — G47.00 INSOMNIA, UNSPECIFIED TYPE: ICD-10-CM

## 2022-01-01 DIAGNOSIS — E78.2 MIXED HYPERLIPIDEMIA: ICD-10-CM

## 2022-01-01 DIAGNOSIS — E11.9 CONTROLLED TYPE 2 DIABETES MELLITUS WITHOUT COMPLICATION, WITHOUT LONG-TERM CURRENT USE OF INSULIN (HCC): Primary | ICD-10-CM

## 2022-01-01 DIAGNOSIS — R04.2 HEMOPTYSIS: ICD-10-CM

## 2022-01-01 DIAGNOSIS — R09.02 HYPOXEMIA: Primary | ICD-10-CM

## 2022-01-01 DIAGNOSIS — E55.9 VITAMIN D DEFICIENCY: ICD-10-CM

## 2022-01-01 DIAGNOSIS — Z79.01 CURRENT USE OF LONG TERM ANTICOAGULATION: ICD-10-CM

## 2022-01-01 DIAGNOSIS — G47.33 OSA (OBSTRUCTIVE SLEEP APNEA): ICD-10-CM

## 2022-01-01 DIAGNOSIS — E53.8 VITAMIN B12 DEFICIENCY: ICD-10-CM

## 2022-01-01 DIAGNOSIS — J18.9 PNEUMONIA OF LEFT LUNG DUE TO INFECTIOUS ORGANISM, UNSPECIFIED PART OF LUNG: ICD-10-CM

## 2022-01-01 DIAGNOSIS — I50.9 CONGESTIVE HEART FAILURE, UNSPECIFIED HF CHRONICITY, UNSPECIFIED HEART FAILURE TYPE (HCC): ICD-10-CM

## 2022-01-01 LAB
ALBUMIN SERPL-MCNC: 3 G/DL (ref 3.4–5)
ALBUMIN SERPL-MCNC: 3.9 G/DL (ref 3.5–4.7)
ALBUMIN/GLOB SERPL: 0.7 {RATIO} (ref 0.8–1.7)
ALBUMIN/GLOB SERPL: 1.1 {RATIO}
ALP SERPL-CCNC: 65 U/L (ref 38–126)
ALP SERPL-CCNC: 68 U/L (ref 45–117)
ALT SERPL-CCNC: 30 U/L (ref 16–61)
ALT SERPL-CCNC: 36 U/L (ref 3–72)
ANION GAP SERPL CALC-SCNC: 13 MMOL/L
ANION GAP SERPL CALC-SCNC: 13 MMOL/L
ANION GAP SERPL CALC-SCNC: 8 MMOL/L (ref 3–18)
APPEARANCE UR: CLEAR
ARTERIAL PATENCY WRIST A: ABNORMAL
ARTERIAL PATENCY WRIST A: ABNORMAL
AST SERPL W P-5'-P-CCNC: 49 U/L (ref 10–38)
AST SERPL W P-5'-P-CCNC: 56 U/L (ref 17–74)
ATRIAL RATE: 60 BPM
BACTERIA SPEC CULT: NORMAL
BACTERIA URNS QL MICRO: ABNORMAL /HPF
BASE DEFICIT BLDA-SCNC: 3.9 MMOL/L (ref 0–2.5)
BASE DEFICIT BLDA-SCNC: 4.2 MMOL/L (ref 0–2.5)
BASOPHILS # BLD: 0.1 K/UL (ref 0–0.1)
BASOPHILS NFR BLD: 1 % (ref 0–2)
BDY SITE: ABNORMAL
BDY SITE: ABNORMAL
BILIRUB SERPL-MCNC: 0.8 MG/DL (ref 0.2–1)
BILIRUB SERPL-MCNC: 1.2 MG/DL (ref 0.2–1)
BILIRUB UR QL: NEGATIVE
BNP SERPL-MCNC: 163 PG/ML (ref 0–100)
BUN SERPL-MCNC: 16 MG/DL (ref 7–18)
BUN SERPL-MCNC: 17 MG/DL (ref 9–21)
BUN SERPL-MCNC: 18 MG/DL (ref 9–21)
BUN/CREAT SERPL: 16 (ref 12–20)
BUN/CREAT SERPL: 17
BUN/CREAT SERPL: 18
CA-I BLD-MCNC: 8.3 MG/DL (ref 8.5–10.1)
CA-I BLD-MCNC: 9.4 MG/DL (ref 8.5–10.5)
CA-I BLD-MCNC: 9.7 MG/DL (ref 8.5–10.5)
CALCULATED P AXIS, ECG09: -159 DEGREES
CALCULATED R AXIS, ECG10: 49 DEGREES
CALCULATED T AXIS, ECG11: 45 DEGREES
CHLORIDE SERPL-SCNC: 102 MMOL/L (ref 94–111)
CHLORIDE SERPL-SCNC: 103 MMOL/L (ref 94–111)
CHLORIDE SERPL-SCNC: 104 MMOL/L (ref 100–111)
CHOLEST SERPL-MCNC: 148 MG/DL
CO2 SERPL-SCNC: 19 MMOL/L (ref 21–33)
CO2 SERPL-SCNC: 20 MMOL/L (ref 21–33)
CO2 SERPL-SCNC: 21 MMOL/L (ref 21–32)
COHGB MFR BLD: 0.8 % (ref 0–3)
COHGB MFR BLD: 1.2 % (ref 0–3)
COLOR UR: ABNORMAL
COVID-19 RAPID TEST, COVR: NOT DETECTED
CREAT SERPL-MCNC: 0.98 MG/DL (ref 0.6–1.3)
CREAT SERPL-MCNC: 1 MG/DL (ref 0.8–1.5)
CREAT SERPL-MCNC: 1 MG/DL (ref 0.8–1.5)
DIAGNOSIS, 93000: NORMAL
DIFFERENTIAL METHOD BLD: ABNORMAL
EOSINOPHIL # BLD: 0.3 K/UL (ref 0–0.4)
EOSINOPHIL # BLD: 0.3 K/UL (ref 0–0.4)
EOSINOPHIL # BLD: 0.6 K/UL (ref 0–0.4)
EOSINOPHIL NFR BLD: 2 % (ref 0–5)
EOSINOPHIL NFR BLD: 2 % (ref 0–5)
EOSINOPHIL NFR BLD: 5 % (ref 0–5)
EPITH CASTS URNS QL MICRO: ABNORMAL /LPF (ref 0–20)
ERYTHROCYTE [DISTWIDTH] IN BLOOD BY AUTOMATED COUNT: 13.9 % (ref 11.6–14.5)
ERYTHROCYTE [DISTWIDTH] IN BLOOD BY AUTOMATED COUNT: 14.3 % (ref 11.6–14.5)
ERYTHROCYTE [DISTWIDTH] IN BLOOD BY AUTOMATED COUNT: 14.3 % (ref 11.6–14.5)
EST. AVERAGE GLUCOSE BLD GHB EST-MCNC: 143 MG/DL
FIO2 ON VENT: 28 %
FIO2 ON VENT: 95 %
FLUAV AG NPH QL IA: NEGATIVE
FLUBV AG NOSE QL IA: NEGATIVE
GAS FLOW.O2 O2 DELIVERY SYS: 15 L/MIN
GAS FLOW.O2 O2 DELIVERY SYS: 2 L/MIN
GLOBULIN SER CALC-MCNC: 3.7 G/DL
GLOBULIN SER CALC-MCNC: 4.1 G/DL (ref 2–4)
GLUCOSE BLD STRIP.AUTO-MCNC: 102 MG/DL (ref 70–110)
GLUCOSE BLD STRIP.AUTO-MCNC: 123 MG/DL (ref 70–110)
GLUCOSE BLD STRIP.AUTO-MCNC: 85 MG/DL (ref 70–110)
GLUCOSE SERPL-MCNC: 153 MG/DL (ref 74–99)
GLUCOSE SERPL-MCNC: 157 MG/DL (ref 70–110)
GLUCOSE SERPL-MCNC: 159 MG/DL (ref 70–110)
GLUCOSE UR STRIP.AUTO-MCNC: NEGATIVE MG/DL
HBA1C MFR BLD: 6.6 % (ref 4.2–5.6)
HCO3 BLDA-SCNC: 18 MMOL/L (ref 23–27)
HCO3 BLDA-SCNC: 19 MMOL/L (ref 23–27)
HCT VFR BLD AUTO: 35.4 % (ref 36–48)
HCT VFR BLD AUTO: 40.2 % (ref 36–48)
HCT VFR BLD AUTO: 41.1 % (ref 36–48)
HDLC SERPL-MCNC: 43 MG/DL (ref 40–60)
HDLC SERPL: 3.4 {RATIO} (ref 0–5)
HGB BLD OXIMETRY-MCNC: 12.4 G/DL (ref 12–16)
HGB BLD OXIMETRY-MCNC: 13.6 G/DL (ref 12–16)
HGB BLD-MCNC: 11.6 G/DL (ref 13–16)
HGB BLD-MCNC: 12.9 G/DL (ref 13–16)
HGB BLD-MCNC: 13.1 G/DL (ref 13–16)
HGB UR QL STRIP: NEGATIVE
IMM GRANULOCYTES # BLD AUTO: 0.1 K/UL (ref 0–0.04)
IMM GRANULOCYTES # BLD AUTO: 0.1 K/UL (ref 0–0.04)
IMM GRANULOCYTES # BLD AUTO: 0.2 K/UL (ref 0–0.04)
IMM GRANULOCYTES NFR BLD AUTO: 1 % (ref 0–0.5)
KETONES UR QL STRIP.AUTO: ABNORMAL MG/DL
LACTATE SERPL-SCNC: 1.8 MMOL/L (ref 0.5–2)
LDLC SERPL CALC-MCNC: 75.8 MG/DL (ref 0–100)
LEUKOCYTE ESTERASE UR QL STRIP.AUTO: ABNORMAL
LIPID PROFILE,FLP: NORMAL
LYMPHOCYTES # BLD: 1.2 K/UL (ref 0.9–3.6)
LYMPHOCYTES # BLD: 1.2 K/UL (ref 0.9–3.6)
LYMPHOCYTES # BLD: 1.7 K/UL (ref 0.9–3.6)
LYMPHOCYTES NFR BLD: 14 % (ref 21–52)
LYMPHOCYTES NFR BLD: 7 % (ref 21–52)
LYMPHOCYTES NFR BLD: 8 % (ref 21–52)
MAGNESIUM SERPL-MCNC: 1.3 MG/DL (ref 1.6–2.6)
MCH RBC QN AUTO: 28.4 PG (ref 24–34)
MCH RBC QN AUTO: 28.7 PG (ref 24–34)
MCH RBC QN AUTO: 28.9 PG (ref 24–34)
MCHC RBC AUTO-ENTMCNC: 31.4 G/DL (ref 31–37)
MCHC RBC AUTO-ENTMCNC: 32.6 G/DL (ref 31–37)
MCHC RBC AUTO-ENTMCNC: 32.8 G/DL (ref 31–37)
MCV RBC AUTO: 88.1 FL (ref 78–100)
MCV RBC AUTO: 88.2 FL (ref 78–100)
MCV RBC AUTO: 90.3 FL (ref 78–100)
METHGB MFR BLD: 0.1 % (ref 0–3)
METHGB MFR BLD: 0.3 % (ref 0–3)
MONOCYTES # BLD: 0.7 K/UL (ref 0.05–1.2)
MONOCYTES # BLD: 0.9 K/UL (ref 0.05–1.2)
MONOCYTES # BLD: 1 K/UL (ref 0.05–1.2)
MONOCYTES NFR BLD: 5 % (ref 3–10)
MONOCYTES NFR BLD: 6 % (ref 3–10)
MONOCYTES NFR BLD: 7 % (ref 3–10)
NEUTS SEG # BLD: 12.5 K/UL (ref 1.8–8)
NEUTS SEG # BLD: 14.9 K/UL (ref 1.8–8)
NEUTS SEG # BLD: 8.9 K/UL (ref 1.8–8)
NEUTS SEG NFR BLD: 73 % (ref 40–73)
NEUTS SEG NFR BLD: 81 % (ref 40–73)
NEUTS SEG NFR BLD: 84 % (ref 40–73)
NITRITE UR QL STRIP.AUTO: NEGATIVE
NRBC # BLD: 0 K/UL (ref 0–0.01)
NRBC BLD-RTO: 0 PER 100 WBC
OXYHGB MFR BLD: 88.7 % (ref 90–100)
OXYHGB MFR BLD: 89.1 % (ref 90–100)
P-R INTERVAL, ECG05: 203 MS
PCO2 BLDA: 27 MMHG (ref 35–45)
PCO2 BLDA: 27 MMHG (ref 35–45)
PERFORMED BY, TECHID: ABNORMAL
PERFORMED BY, TECHID: NORMAL
PERFORMED BY, TECHID: NORMAL
PH BLDA: 7.46 [PH] (ref 7.37–7.43)
PH BLDA: 7.46 [PH] (ref 7.37–7.43)
PH UR STRIP: 5.5 [PH] (ref 5–8)
PLATELET # BLD AUTO: 196 K/UL (ref 135–420)
PLATELET # BLD AUTO: 200 K/UL (ref 135–420)
PLATELET # BLD AUTO: 208 K/UL (ref 135–420)
PMV BLD AUTO: 10.4 FL (ref 9.2–11.8)
PMV BLD AUTO: 10.4 FL (ref 9.2–11.8)
PMV BLD AUTO: 10.8 FL (ref 9.2–11.8)
PO2 BLDA: 56 MMHG (ref 84–98)
PO2 BLDA: 57 MMHG (ref 84–98)
POTASSIUM SERPL-SCNC: 4 MMOL/L (ref 3.2–5.1)
POTASSIUM SERPL-SCNC: 4 MMOL/L (ref 3.5–5.5)
POTASSIUM SERPL-SCNC: 4.6 MMOL/L (ref 3.2–5.1)
PROT SERPL-MCNC: 7.1 G/DL (ref 6.4–8.2)
PROT SERPL-MCNC: 7.6 G/DL (ref 6.1–8.4)
PROT UR STRIP-MCNC: NEGATIVE MG/DL
Q-T INTERVAL, ECG07: 481 MS
QRS DURATION, ECG06: 121 MS
QTC CALCULATION (BEZET), ECG08: 481 MS
RBC # BLD AUTO: 4.02 M/UL (ref 4.35–5.65)
RBC # BLD AUTO: 4.55 M/UL (ref 4.35–5.65)
RBC # BLD AUTO: 4.56 M/UL (ref 4.35–5.65)
RBC #/AREA URNS HPF: ABNORMAL /HPF (ref 0–2)
SAO2 % BLD: 90 % (ref 94–100)
SAO2 % BLD: 90 % (ref 94–100)
SAO2% DEVICE SAO2% SENSOR NAME: ABNORMAL
SAO2% DEVICE SAO2% SENSOR NAME: ABNORMAL
SODIUM SERPL-SCNC: 133 MMOL/L (ref 136–145)
SODIUM SERPL-SCNC: 134 MMOL/L (ref 135–145)
SODIUM SERPL-SCNC: 136 MMOL/L (ref 135–145)
SP GR UR REFRACTOMETRY: 1.02 (ref 1–1.03)
SPECIAL REQUESTS,SREQ: NORMAL
SPECIMEN SITE: ABNORMAL
SPECIMEN SITE: ABNORMAL
TRIGL SERPL-MCNC: 146 MG/DL (ref ?–150)
TROPONIN I SERPL-MCNC: <0.02 NG/ML (ref 0.02–0.05)
TROPONIN I SERPL-MCNC: <0.02 NG/ML (ref 0.02–0.05)
TROPONIN-HIGH SENSITIVITY: 11 NG/L (ref 0–78)
UROBILINOGEN UR QL STRIP.AUTO: 1 EU/DL (ref 0.2–1)
VENTRICULAR RATE, ECG03: 60 BPM
VLDLC SERPL CALC-MCNC: 29.2 MG/DL
WBC # BLD AUTO: 12.1 K/UL (ref 4.6–13.2)
WBC # BLD AUTO: 15.3 K/UL (ref 4.6–13.2)
WBC # BLD AUTO: 17.5 K/UL (ref 4.6–13.2)
WBC URNS QL MICRO: ABNORMAL /HPF (ref 0–4)

## 2022-01-01 PROCEDURE — 77010033678 HC OXYGEN DAILY

## 2022-01-01 PROCEDURE — 65270000029 HC RM PRIVATE

## 2022-01-01 PROCEDURE — 74011250637 HC RX REV CODE- 250/637: Performed by: INTERNAL MEDICINE

## 2022-01-01 PROCEDURE — 74011000250 HC RX REV CODE- 250: Performed by: NURSE PRACTITIONER

## 2022-01-01 PROCEDURE — 36415 COLL VENOUS BLD VENIPUNCTURE: CPT

## 2022-01-01 PROCEDURE — 83036 HEMOGLOBIN GLYCOSYLATED A1C: CPT

## 2022-01-01 PROCEDURE — 71045 X-RAY EXAM CHEST 1 VIEW: CPT

## 2022-01-01 PROCEDURE — 85025 COMPLETE CBC W/AUTO DIFF WBC: CPT

## 2022-01-01 PROCEDURE — 80061 LIPID PANEL: CPT

## 2022-01-01 PROCEDURE — 87635 SARS-COV-2 COVID-19 AMP PRB: CPT

## 2022-01-01 PROCEDURE — 94761 N-INVAS EAR/PLS OXIMETRY MLT: CPT

## 2022-01-01 PROCEDURE — 99285 EMERGENCY DEPT VISIT HI MDM: CPT

## 2022-01-01 PROCEDURE — 87086 URINE CULTURE/COLONY COUNT: CPT

## 2022-01-01 PROCEDURE — 74011250636 HC RX REV CODE- 250/636: Performed by: NURSE PRACTITIONER

## 2022-01-01 PROCEDURE — 87804 INFLUENZA ASSAY W/OPTIC: CPT

## 2022-01-01 PROCEDURE — 83605 ASSAY OF LACTIC ACID: CPT

## 2022-01-01 PROCEDURE — 87040 BLOOD CULTURE FOR BACTERIA: CPT

## 2022-01-01 PROCEDURE — 74011000258 HC RX REV CODE- 258: Performed by: NURSE PRACTITIONER

## 2022-01-01 PROCEDURE — 81001 URINALYSIS AUTO W/SCOPE: CPT

## 2022-01-01 PROCEDURE — 74011250637 HC RX REV CODE- 250/637: Performed by: NURSE PRACTITIONER

## 2022-01-01 PROCEDURE — 82962 GLUCOSE BLOOD TEST: CPT

## 2022-01-01 PROCEDURE — 80053 COMPREHEN METABOLIC PANEL: CPT

## 2022-01-01 PROCEDURE — 74011636637 HC RX REV CODE- 636/637: Performed by: NURSE PRACTITIONER

## 2022-01-01 PROCEDURE — 80048 BASIC METABOLIC PNL TOTAL CA: CPT

## 2022-01-01 PROCEDURE — 83880 ASSAY OF NATRIURETIC PEPTIDE: CPT

## 2022-01-01 PROCEDURE — G8417 CALC BMI ABV UP PARAM F/U: HCPCS | Performed by: NURSE PRACTITIONER

## 2022-01-01 PROCEDURE — 74011000258 HC RX REV CODE- 258: Performed by: EMERGENCY MEDICINE

## 2022-01-01 PROCEDURE — 99214 OFFICE O/P EST MOD 30 MIN: CPT | Performed by: NURSE PRACTITIONER

## 2022-01-01 PROCEDURE — G8427 DOCREV CUR MEDS BY ELIG CLIN: HCPCS | Performed by: NURSE PRACTITIONER

## 2022-01-01 PROCEDURE — 93005 ELECTROCARDIOGRAM TRACING: CPT

## 2022-01-01 PROCEDURE — 84484 ASSAY OF TROPONIN QUANT: CPT

## 2022-01-01 PROCEDURE — G8754 DIAS BP LESS 90: HCPCS | Performed by: NURSE PRACTITIONER

## 2022-01-01 PROCEDURE — G8432 DEP SCR NOT DOC, RNG: HCPCS | Performed by: NURSE PRACTITIONER

## 2022-01-01 PROCEDURE — 36600 WITHDRAWAL OF ARTERIAL BLOOD: CPT

## 2022-01-01 PROCEDURE — 83735 ASSAY OF MAGNESIUM: CPT

## 2022-01-01 PROCEDURE — 1101F PT FALLS ASSESS-DOCD LE1/YR: CPT | Performed by: NURSE PRACTITIONER

## 2022-01-01 PROCEDURE — 74011250636 HC RX REV CODE- 250/636: Performed by: EMERGENCY MEDICINE

## 2022-01-01 PROCEDURE — G8752 SYS BP LESS 140: HCPCS | Performed by: NURSE PRACTITIONER

## 2022-01-01 PROCEDURE — 82803 BLOOD GASES ANY COMBINATION: CPT

## 2022-01-01 PROCEDURE — G8536 NO DOC ELDER MAL SCRN: HCPCS | Performed by: NURSE PRACTITIONER

## 2022-01-01 PROCEDURE — 94660 CPAP INITIATION&MGMT: CPT

## 2022-01-01 RX ORDER — SPIRONOLACTONE 25 MG/1
25 TABLET ORAL DAILY
Status: DISCONTINUED | OUTPATIENT
Start: 2022-01-01 | End: 2022-01-01 | Stop reason: HOSPADM

## 2022-01-01 RX ORDER — ATORVASTATIN CALCIUM 10 MG/1
20 TABLET, FILM COATED ORAL DAILY
Status: DISCONTINUED | OUTPATIENT
Start: 2022-01-01 | End: 2022-01-01 | Stop reason: HOSPADM

## 2022-01-01 RX ORDER — FUROSEMIDE 10 MG/ML
40 INJECTION INTRAMUSCULAR; INTRAVENOUS ONCE
Status: COMPLETED | OUTPATIENT
Start: 2022-01-01 | End: 2022-01-01

## 2022-01-01 RX ORDER — ASPIRIN 81 MG/1
81 TABLET ORAL DAILY
Status: DISCONTINUED | OUTPATIENT
Start: 2022-01-01 | End: 2022-01-01 | Stop reason: HOSPADM

## 2022-01-01 RX ORDER — GLUCOSAMINE SULFATE 1500 MG
1000 POWDER IN PACKET (EA) ORAL DAILY
COMMUNITY

## 2022-01-01 RX ORDER — FUROSEMIDE 20 MG/1
20 TABLET ORAL
Status: DISCONTINUED | OUTPATIENT
Start: 2022-01-01 | End: 2022-01-01 | Stop reason: HOSPADM

## 2022-01-01 RX ORDER — ONDANSETRON 4 MG/1
4 TABLET, ORALLY DISINTEGRATING ORAL
Status: DISCONTINUED | OUTPATIENT
Start: 2022-01-01 | End: 2022-01-01 | Stop reason: HOSPADM

## 2022-01-01 RX ORDER — ACETAMINOPHEN 650 MG/1
650 SUPPOSITORY RECTAL
Status: DISCONTINUED | OUTPATIENT
Start: 2022-01-01 | End: 2022-01-01 | Stop reason: HOSPADM

## 2022-01-01 RX ORDER — SODIUM CHLORIDE 0.9 % (FLUSH) 0.9 %
5-40 SYRINGE (ML) INJECTION EVERY 8 HOURS
Status: DISCONTINUED | OUTPATIENT
Start: 2022-01-01 | End: 2022-01-01 | Stop reason: HOSPADM

## 2022-01-01 RX ORDER — ATORVASTATIN CALCIUM 20 MG/1
20 TABLET, FILM COATED ORAL DAILY
Qty: 90 TABLET | Refills: 2 | Status: SHIPPED | OUTPATIENT
Start: 2022-01-01

## 2022-01-01 RX ORDER — SACUBITRIL AND VALSARTAN 49; 51 MG/1; MG/1
1 TABLET, FILM COATED ORAL 2 TIMES DAILY
Status: ON HOLD | COMMUNITY
End: 2022-01-01

## 2022-01-01 RX ORDER — FUROSEMIDE 20 MG/1
10 TABLET ORAL
Status: DISCONTINUED | OUTPATIENT
Start: 2022-01-01 | End: 2022-01-01 | Stop reason: HOSPADM

## 2022-01-01 RX ORDER — MAGNESIUM SULFATE HEPTAHYDRATE 40 MG/ML
2 INJECTION, SOLUTION INTRAVENOUS ONCE
Status: COMPLETED | OUTPATIENT
Start: 2022-01-01 | End: 2022-01-01

## 2022-01-01 RX ORDER — GUAIFENESIN 600 MG/1
600 TABLET, EXTENDED RELEASE ORAL 2 TIMES DAILY
Status: DISCONTINUED | OUTPATIENT
Start: 2022-01-01 | End: 2022-01-01 | Stop reason: HOSPADM

## 2022-01-01 RX ORDER — SODIUM CHLORIDE 0.9 % (FLUSH) 0.9 %
5-40 SYRINGE (ML) INJECTION AS NEEDED
Status: DISCONTINUED | OUTPATIENT
Start: 2022-01-01 | End: 2022-01-01 | Stop reason: HOSPADM

## 2022-01-01 RX ORDER — MODAFINIL 200 MG/1
200 TABLET ORAL DAILY
Status: DISCONTINUED | OUTPATIENT
Start: 2022-01-01 | End: 2022-01-01

## 2022-01-01 RX ORDER — LANOLIN ALCOHOL/MO/W.PET/CERES
1000 CREAM (GRAM) TOPICAL DAILY
Status: DISCONTINUED | OUTPATIENT
Start: 2022-01-01 | End: 2022-01-01 | Stop reason: HOSPADM

## 2022-01-01 RX ORDER — SACUBITRIL AND VALSARTAN 24; 26 MG/1; MG/1
1 TABLET, FILM COATED ORAL 2 TIMES DAILY
Qty: 180 TABLET | Refills: 2 | Status: SHIPPED | OUTPATIENT
Start: 2022-01-01

## 2022-01-01 RX ORDER — BRIMONIDINE TARTRATE, TIMOLOL MALEATE 2; 5 MG/ML; MG/ML
1 SOLUTION/ DROPS OPHTHALMIC EVERY 12 HOURS
Status: DISCONTINUED | OUTPATIENT
Start: 2022-01-01 | End: 2022-01-01 | Stop reason: CLARIF

## 2022-01-01 RX ORDER — CARVEDILOL 12.5 MG/1
12.5 TABLET ORAL 2 TIMES DAILY WITH MEALS
Status: DISCONTINUED | OUTPATIENT
Start: 2022-01-01 | End: 2022-01-01 | Stop reason: HOSPADM

## 2022-01-01 RX ORDER — LANOLIN ALCOHOL/MO/W.PET/CERES
1000 CREAM (GRAM) TOPICAL DAILY
COMMUNITY

## 2022-01-01 RX ORDER — BENZONATATE 100 MG/1
100 CAPSULE ORAL
Status: DISCONTINUED | OUTPATIENT
Start: 2022-01-01 | End: 2022-01-01 | Stop reason: HOSPADM

## 2022-01-01 RX ORDER — INSULIN LISPRO 100 [IU]/ML
INJECTION, SOLUTION INTRAVENOUS; SUBCUTANEOUS
Status: DISCONTINUED | OUTPATIENT
Start: 2022-01-01 | End: 2022-01-01 | Stop reason: HOSPADM

## 2022-01-01 RX ORDER — BENZONATATE 100 MG/1
100 CAPSULE ORAL
COMMUNITY

## 2022-01-01 RX ORDER — AMIODARONE HYDROCHLORIDE 200 MG/1
200 TABLET ORAL DAILY
Qty: 90 TABLET | Refills: 2 | Status: SHIPPED | OUTPATIENT
Start: 2022-01-01

## 2022-01-01 RX ORDER — MODAFINIL 200 MG/1
200 TABLET ORAL DAILY
Qty: 90 TABLET | Refills: 1 | Status: SHIPPED | OUTPATIENT
Start: 2022-01-01

## 2022-01-01 RX ORDER — POLYETHYLENE GLYCOL 3350 17 G/17G
17 POWDER, FOR SOLUTION ORAL DAILY PRN
Status: DISCONTINUED | OUTPATIENT
Start: 2022-01-01 | End: 2022-01-01 | Stop reason: HOSPADM

## 2022-01-01 RX ORDER — IPRATROPIUM BROMIDE AND ALBUTEROL SULFATE 2.5; .5 MG/3ML; MG/3ML
3 SOLUTION RESPIRATORY (INHALATION)
Status: DISCONTINUED | OUTPATIENT
Start: 2022-01-01 | End: 2022-01-01 | Stop reason: HOSPADM

## 2022-01-01 RX ORDER — ONDANSETRON 2 MG/ML
4 INJECTION INTRAMUSCULAR; INTRAVENOUS
Status: DISCONTINUED | OUTPATIENT
Start: 2022-01-01 | End: 2022-01-01 | Stop reason: HOSPADM

## 2022-01-01 RX ORDER — LATANOPROST 50 UG/ML
1 SOLUTION/ DROPS OPHTHALMIC
Status: DISCONTINUED | OUTPATIENT
Start: 2022-01-01 | End: 2022-01-01 | Stop reason: HOSPADM

## 2022-01-01 RX ORDER — DOCUSATE SODIUM 100 MG/1
100 CAPSULE, LIQUID FILLED ORAL 2 TIMES DAILY
Status: DISCONTINUED | OUTPATIENT
Start: 2022-01-01 | End: 2022-01-01 | Stop reason: HOSPADM

## 2022-01-01 RX ORDER — TIMOLOL MALEATE 5 MG/ML
1 SOLUTION/ DROPS OPHTHALMIC EVERY 12 HOURS
Status: DISCONTINUED | OUTPATIENT
Start: 2022-01-01 | End: 2022-01-01 | Stop reason: HOSPADM

## 2022-01-01 RX ORDER — BRIMONIDINE TARTRATE 2 MG/ML
1 SOLUTION/ DROPS OPHTHALMIC EVERY 12 HOURS
Status: DISCONTINUED | OUTPATIENT
Start: 2022-01-01 | End: 2022-01-01 | Stop reason: HOSPADM

## 2022-01-01 RX ORDER — CARVEDILOL 12.5 MG/1
12.5 TABLET ORAL 2 TIMES DAILY WITH MEALS
Qty: 180 TABLET | Refills: 2 | Status: SHIPPED | OUTPATIENT
Start: 2022-01-01

## 2022-01-01 RX ORDER — BRIMONIDINE TARTRATE, TIMOLOL MALEATE 2; 5 MG/ML; MG/ML
1 SOLUTION/ DROPS OPHTHALMIC EVERY 12 HOURS
COMMUNITY

## 2022-01-01 RX ORDER — ENOXAPARIN SODIUM 100 MG/ML
40 INJECTION SUBCUTANEOUS DAILY
Status: DISCONTINUED | OUTPATIENT
Start: 2022-01-01 | End: 2022-01-01

## 2022-01-01 RX ORDER — MELATONIN
1000 DAILY
Status: DISCONTINUED | OUTPATIENT
Start: 2022-01-01 | End: 2022-01-01 | Stop reason: HOSPADM

## 2022-01-01 RX ORDER — AMIODARONE HYDROCHLORIDE 200 MG/1
200 TABLET ORAL DAILY
Status: DISCONTINUED | OUTPATIENT
Start: 2022-01-01 | End: 2022-01-01 | Stop reason: HOSPADM

## 2022-01-01 RX ORDER — ACETAMINOPHEN 325 MG/1
650 TABLET ORAL
Status: DISCONTINUED | OUTPATIENT
Start: 2022-01-01 | End: 2022-01-01 | Stop reason: HOSPADM

## 2022-01-01 RX ADMIN — BENZONATATE 100 MG: 100 CAPSULE ORAL at 20:38

## 2022-01-01 RX ADMIN — ASPIRIN 81 MG: 81 TABLET, COATED ORAL at 10:22

## 2022-01-01 RX ADMIN — BRIMONIDINE TARTRATE 1 DROP: 2 SOLUTION OPHTHALMIC at 09:00

## 2022-01-01 RX ADMIN — GUAIFENESIN 600 MG: 600 TABLET, EXTENDED RELEASE ORAL at 20:38

## 2022-01-01 RX ADMIN — CEFTRIAXONE 1 G: 1 INJECTION, POWDER, FOR SOLUTION INTRAMUSCULAR; INTRAVENOUS at 02:03

## 2022-01-01 RX ADMIN — CARVEDILOL 12.5 MG: 12.5 TABLET, FILM COATED ORAL at 10:23

## 2022-01-01 RX ADMIN — ASPIRIN 81 MG: 81 TABLET, COATED ORAL at 09:08

## 2022-01-01 RX ADMIN — SODIUM CHLORIDE, PRESERVATIVE FREE 10 ML: 5 INJECTION INTRAVENOUS at 10:26

## 2022-01-01 RX ADMIN — Medication 1000 UNITS: at 10:22

## 2022-01-01 RX ADMIN — SODIUM CHLORIDE, PRESERVATIVE FREE 10 ML: 5 INJECTION INTRAVENOUS at 06:34

## 2022-01-01 RX ADMIN — CEFTRIAXONE 1 G: 1 INJECTION, POWDER, FOR SOLUTION INTRAMUSCULAR; INTRAVENOUS at 03:20

## 2022-01-01 RX ADMIN — LATANOPROST 1 DROP: 50 SOLUTION OPHTHALMIC at 21:07

## 2022-01-01 RX ADMIN — AZITHROMYCIN MONOHYDRATE 500 MG: 500 INJECTION, POWDER, LYOPHILIZED, FOR SOLUTION INTRAVENOUS at 02:35

## 2022-01-01 RX ADMIN — INSULIN LISPRO 2 UNITS: 100 INJECTION, SOLUTION INTRAVENOUS; SUBCUTANEOUS at 08:04

## 2022-01-01 RX ADMIN — DOCUSATE SODIUM 100 MG: 100 CAPSULE ORAL at 09:14

## 2022-01-01 RX ADMIN — AZITHROMYCIN MONOHYDRATE 500 MG: 500 INJECTION, POWDER, LYOPHILIZED, FOR SOLUTION INTRAVENOUS at 03:19

## 2022-01-01 RX ADMIN — APIXABAN 5 MG: 5 TABLET, FILM COATED ORAL at 10:22

## 2022-01-01 RX ADMIN — SODIUM CHLORIDE, PRESERVATIVE FREE 10 ML: 5 INJECTION INTRAVENOUS at 21:07

## 2022-01-01 RX ADMIN — AMIODARONE HYDROCHLORIDE 200 MG: 200 TABLET ORAL at 10:22

## 2022-01-01 RX ADMIN — APIXABAN 5 MG: 5 TABLET, FILM COATED ORAL at 09:15

## 2022-01-01 RX ADMIN — Medication 1000 UNITS: at 09:14

## 2022-01-01 RX ADMIN — MAGNESIUM SULFATE HEPTAHYDRATE 2 G: 40 INJECTION, SOLUTION INTRAVENOUS at 06:05

## 2022-01-01 RX ADMIN — BENZONATATE 100 MG: 100 CAPSULE ORAL at 10:21

## 2022-01-01 RX ADMIN — BRIMONIDINE TARTRATE 1 DROP: 2 SOLUTION OPHTHALMIC at 20:36

## 2022-01-01 RX ADMIN — ACETAMINOPHEN 650 MG: 325 TABLET ORAL at 00:00

## 2022-01-01 RX ADMIN — ATORVASTATIN CALCIUM 20 MG: 10 TABLET, FILM COATED ORAL at 09:11

## 2022-01-01 RX ADMIN — FUROSEMIDE 10 MG: 20 TABLET ORAL at 09:12

## 2022-01-01 RX ADMIN — ATORVASTATIN CALCIUM 20 MG: 10 TABLET, FILM COATED ORAL at 10:22

## 2022-01-01 RX ADMIN — NITROGLYCERIN 0.5 INCH: 20 OINTMENT TOPICAL at 01:24

## 2022-01-01 RX ADMIN — SODIUM CHLORIDE, PRESERVATIVE FREE 10 ML: 5 INJECTION INTRAVENOUS at 14:19

## 2022-01-01 RX ADMIN — FUROSEMIDE 40 MG: 10 INJECTION, SOLUTION INTRAMUSCULAR; INTRAVENOUS at 01:22

## 2022-01-01 RX ADMIN — GUAIFENESIN 600 MG: 600 TABLET, EXTENDED RELEASE ORAL at 09:12

## 2022-01-01 RX ADMIN — SODIUM CHLORIDE, PRESERVATIVE FREE 10 ML: 5 INJECTION INTRAVENOUS at 06:05

## 2022-01-01 RX ADMIN — APIXABAN 5 MG: 5 TABLET, FILM COATED ORAL at 17:04

## 2022-01-01 RX ADMIN — SPIRONOLACTONE 25 MG: 25 TABLET ORAL at 10:22

## 2022-01-01 RX ADMIN — DOCUSATE SODIUM 100 MG: 100 CAPSULE ORAL at 10:21

## 2022-01-01 RX ADMIN — CARVEDILOL 12.5 MG: 12.5 TABLET, FILM COATED ORAL at 17:04

## 2022-01-01 RX ADMIN — TIMOLOL MALEATE 1 DROP: 5 SOLUTION OPHTHALMIC at 09:00

## 2022-01-01 RX ADMIN — CYANOCOBALAMIN TAB 500 MCG 1000 MCG: 500 TAB at 10:23

## 2022-01-01 RX ADMIN — DOCUSATE SODIUM 100 MG: 100 CAPSULE ORAL at 17:04

## 2022-01-01 RX ADMIN — AMIODARONE HYDROCHLORIDE 200 MG: 200 TABLET ORAL at 09:08

## 2022-01-01 RX ADMIN — CYANOCOBALAMIN TAB 500 MCG 1000 MCG: 500 TAB at 09:12

## 2022-01-01 RX ADMIN — TIMOLOL MALEATE 1 DROP: 5 SOLUTION OPHTHALMIC at 20:36

## 2022-01-11 NOTE — TELEPHONE ENCOUNTER
----- Message from [id number] sent at 1/11/2022 12:39 PM EST -----  Regarding: Januvia and Pulmonologist Visit  1. Can yall order a 90 day prescription from [facility] please. 2. 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 Have another follow up with Dr. Alf Sow on 2/4/22 to get results of blood work and establish a plan. 3. Hope you had a great holiday!   Warm regards, [facility]

## 2022-02-15 NOTE — TELEPHONE ENCOUNTER
Message from [address] [place] [address]:    RobWenonah please give him a call the [facility] physical therapy groups generally have pulmonary rehab programs (closest being [place] I think)).  I would also ask that he possibly check with the pulmonology group that dx him with idiopathic pulmonary fibrosis.  Regardless please place a referral I will be happy to help in any way I can thank you    Message text

## 2022-02-15 NOTE — TELEPHONE ENCOUNTER
Patient aware to check with pulmonology group and [facility] physical therapy and to contact us back with the information and then we will place the referral.

## 2022-02-15 NOTE — TELEPHONE ENCOUNTER
----- Message from Jayson Powers NP sent at 2022  2:09 PM EST -----  Regarding: FW: Prescription Refills - 2022  [facility] please give him a call the [facility] physical therapy groups generally have pulmonary rehab programs (closest being [place] I think)). I would also ask that he possibly check with the pulmonology group that dx him with idiopathic pulmonary fibrosis. Regardless please place a referral I will be happy to help in any way I can thank you  ----- Message -----  From: Janes Dominguezin2022  11:34 AM EST  To: Jayson Powers NP  Subject: FW: Prescription Refills - 2022              ----- Message -----  From: Kellie Elena  Sent: 2022  10:12 AM EST  To: [facility]  Subject: Prescription Refills - 2022                [facility], do yall know of any Pulmonary Rehab programs in the area? I have been diagnosed with IPF. (really sucks)!

## 2022-05-06 NOTE — PROGRESS NOTES
Romana Marking presents today for   Chief Complaint   Patient presents with    Follow-up       Is someone accompanying this pt? No    Is the patient using any DME equipment during OV? No    Depression Screening:  3 most recent PHQ Screens 5/6/2022   Little interest or pleasure in doing things Not at all   Feeling down, depressed, irritable, or hopeless Not at all   Total Score PHQ 2 0       Learning Assessment:  Learning Assessment 9/24/2020   PRIMARY LEARNER Patient   HIGHEST LEVEL OF EDUCATION - PRIMARY LEARNER  SOME COLLEGE   BARRIERS PRIMARY LEARNER NONE   CO-LEARNER CAREGIVER -   PRIMARY LANGUAGE ENGLISH   LEARNER PREFERENCE PRIMARY READING   ANSWERED BY patient   RELATIONSHIP SELF       Fall Risk  Fall Risk Assessment, last 12 mths 5/6/2022   Able to walk? Yes   Fall in past 12 months? 0   Do you feel unsteady? 0   Are you worried about falling 0   Number of falls in past 12 months -   Fall with injury? -       Health Maintenance reviewed and discussed and ordered per Provider. Health Maintenance Due   Topic Date Due    Eye Exam Retinal or Dilated  Never done    DTaP/Tdap/Td series (1 - Tdap) Never done    Shingrix Vaccine Age 50> (1 of 2) Never done    COVID-19 Vaccine (3 - Booster for Moderna series) 08/18/2021    Medicare Yearly Exam  04/06/2022   . Coordination of Care:    1. Have you been to the ER, urgent care clinic since your last visit? Hospitalized since your last visit? No    2. Have you seen or consulted any other health care providers outside of the [address] [place] [address] since your last visit? Include any pap smears or colon screening. No    3. For patients aged 39-70: Has the patient had a colonoscopy / FIT/ Cologuard? NA - based on age      If the patient is female:    4. For patients aged 41-77: Has the patient had a mammogram within the past 2 years? NA - based on age or sex      11. For patients aged 21-65: Has the patient had a pap smear?  NA - based on age or sex

## 2022-05-06 NOTE — PROGRESS NOTES
Abebe Nichols is a 68 y.o.male presents with   Chief Complaint   Patient presents with    Follow-up       14-year-old male presents today in office for routine follow-up. He has multiple comorbidities to include heart failure has an ICD and pacer as well as coronary artery disease on long-term anticoagulation. He also has diabetes type 2 currently on Januvia last A1c of 6.4 % 2021. In review of patient's most recent note from cardiology at the heart failure clinic it was recommended that patient possibly be started on SGLT2 to help with his heart failure as well as diabetes. He was recently diagnosed with pulmonary fibrosis followed by pulmonology has home O2 on order he is expecting that within the next week. He also has a productive cough which she had while being assessed with blood-tinged sputum on the napkin. Subjective:           Past Medical History:   Diagnosis Date    CAD (coronary artery disease)     Diabetes mellitus type 2, controlled ([address] 8/10/2020    Essential hypertension 8/10/2020    Mixed hyperlipidemia 8/10/2020     Past Surgical History:   Procedure Laterality Date    HX APPENDECTOMY  1986    Appendicitis    HX HEENT      Cataracts  Intra Ocular Implants Normal pressure glaucom    HX IMPLANTABLE CARDIOVERTER DEFIBRILLATOR  2021    HX PACEMAKER      2021 defibrillator also have pacemaker    VASCULAR SURGERY PROCEDURE UNLIST      Quad. Bypass 2005, 3 stents     Social History     Socioeconomic History    Marital status:    Tobacco Use    Smoking status: Former Smoker     Packs/day: 1.00     Years: 25.00     Pack years: 25.00     Types: Cigarettes, Pipe, Cigars     Start date: 1965     Quit date: 1991     Years since quittin.3    Smokeless tobacco: Never Used   Vaping Use    Vaping Use: Never used   Substance and Sexual Activity    Alcohol use:  Yes     Alcohol/week: 0.0 standard drinks     Comment: One drink holidays and birthdays    Drug use: Never    Sexual activity: Not Currently     Partners: Female     Comment: Vasectomy     Current Outpatient Medications   Medication Sig Dispense Refill    cholecalciferol (VITAMIN D3) 25 mcg (1,000 unit) cap Take 1,000 Units by mouth daily.  cyanocobalamin (Vitamin B-12) 1,000 mcg tablet Take 1,000 mcg by mouth daily.  sacubitriL-valsartan (Entresto) 49-51 mg tab tablet Take 1 Tablet by mouth two (2) times a day.  benzonatate (TESSALON) 100 mg capsule Take 100 mg by mouth three (3) times daily as needed for Cough.  brimonidine-timoloL (Combigan) 0.2-0.5 % drop ophthalmic solution Administer 1 Drop to both eyes every twelve (12) hours.  empagliflozin (Jardiance) 25 mg tablet Take 1 Tablet by mouth daily. 90 Tablet 2    modafiniL (PROVIGIL) 200 mg tablet Take 1 Tablet by mouth daily. Max Daily Amount: 200 mg. 90 Tablet 1    apixaban (Eliquis) 5 mg tablet Take 1 Tablet by mouth two (2) times a day. 180 Tablet 2    amiodarone (CORDARONE) 200 mg tablet Take 1 Tablet by mouth daily. 90 Tablet 2    atorvastatin (LIPITOR) 20 mg tablet Take 1 Tablet by mouth daily. 90 Tablet 2    carvediloL (COREG) 12.5 mg tablet Take 1 Tablet by mouth two (2) times daily (with meals). 180 Tablet 2    sacubitriL-valsartan (Entresto) 24-26 mg tablet Take 1 Tablet by mouth two (2) times a day. (Patient taking differently: TAKE 1 TABLET BY ORAL ROUTE IN THE MORNING AND 2 IN THE EVENING) 180 Tablet 2    nystatin (MYCOSTATIN) topical cream Apply  to affected area two (2) times a day. 120 g 1    spironolactone (ALDACTONE) 25 mg tablet Take 1 Tablet by mouth daily. 90 Tablet 1    metFORMIN (GLUCOPHAGE) 500 mg tablet TAKE 2 TABLETS TWICE A DAY WITH MEALS (Patient taking differently: 500 mg. 1 tablet in the morning and 1 tablet at night) 360 Tab 3    aspirin delayed-release 81 mg tablet Take  by mouth daily.  docusate sodium (COLACE) 100 mg capsule Take 100 mg by mouth two (2) times a day.       furosemide (LASIX) 20 mg tablet Take 20 mg by mouth daily. Monday, Wednesday, Friday's, 1/2 tablet      latanoprost (XALATAN) 0.005 % ophthalmic solution Administer 1 Drop to both eyes nightly.  nitroglycerin (Nitrostat) 0.4 mg SL tablet 0.4 mg by SubLINGual route every five (5) minutes as needed for Chest Pain. Up to 3 doses.  albuterol (PROVENTIL HFA, VENTOLIN HFA, PROAIR HFA) 90 mcg/actuation inhaler Take 1 Puff by inhalation every four (4) hours as needed for Wheezing. (Patient not taking: Reported on 5/6/2022) 3 Inhaler 1     No Known Allergies  The patient has a family history of    REVIEW OF SYSTEMS  Review of Systems   Constitutional: Positive for malaise/fatigue and weight loss. Negative for chills and fever. HENT: Negative for ear discharge, ear pain, hearing loss, sinus pain and sore throat. Eyes: Negative for pain. Respiratory: Positive for cough and shortness of breath. Cardiovascular: Negative for chest pain, palpitations and leg swelling. Gastrointestinal: Negative for abdominal pain, nausea and vomiting. Genitourinary: Negative for dysuria, frequency and urgency. Musculoskeletal: Positive for myalgias. Negative for falls and neck pain. Skin: Negative for itching and rash. Neurological: Negative for dizziness, tingling, tremors and headaches. Psychiatric/Behavioral: Negative for depression, substance abuse and suicidal ideas. The patient is not nervous/anxious and does not have insomnia.         Objective:     Visit Vitals  /68 (BP 1 Location: Left upper arm, BP Patient Position: Sitting, BP Cuff Size: Large adult)   Pulse (!) 59   Temp 97.3 °F (36.3 °C) (Temporal)   Resp 20   Ht 5' 8\" (1.727 m)   Wt 200 lb 9.6 oz (91 kg)   SpO2 93%   BMI 30.50 kg/m²       Current Outpatient Medications   Medication Instructions    albuterol (PROVENTIL HFA, VENTOLIN HFA, PROAIR HFA) 90 mcg/actuation inhaler 1 Puff, Inhalation, EVERY 4 HOURS AS NEEDED    amiodarone (CORDARONE) 200 mg, Oral, DAILY    apixaban (ELIQUIS) 5 mg, Oral, 2 TIMES DAILY    aspirin delayed-release 81 mg tablet Oral, DAILY    atorvastatin (LIPITOR) 20 mg, Oral, DAILY    benzonatate (TESSALON) 100 mg, Oral, 3 TIMES DAILY AS NEEDED    brimonidine-timoloL (Combigan) 0.2-0.5 % drop ophthalmic solution 1 Drop, Both Eyes, EVERY 12 HOURS    carvediloL (COREG) 12.5 mg, Oral, 2 TIMES DAILY WITH MEALS    cholecalciferol (VITAMIN D3) 1,000 Units, Oral, DAILY    cyanocobalamin (VITAMIN B-12) 1,000 mcg, Oral, DAILY    docusate sodium (COLACE) 100 mg, Oral, 2 TIMES DAILY    empagliflozin (JARDIANCE) 25 mg, Oral, DAILY    furosemide (LASIX) 20 mg, Oral, DAILY, Monday, Wednesday, Friday's, 1/2 tablet    latanoprost (XALATAN) 0.005 % ophthalmic solution 1 Drop, Both Eyes, EVERY BEDTIME    metFORMIN (GLUCOPHAGE) 500 mg tablet TAKE 2 TABLETS TWICE A DAY WITH MEALS    modafiniL (PROVIGIL) 200 mg, Oral, DAILY    nitroglycerin (NITROSTAT) 0.4 mg, SubLINGual, EVERY 5 MIN AS NEEDED, Up to 3 doses.  nystatin (MYCOSTATIN) topical cream Topical, 2 TIMES DAILY    sacubitriL-valsartan (Entresto) 24-26 mg tablet 1 Tablet, Oral, 2 TIMES DAILY    sacubitriL-valsartan (Entresto) 49-51 mg tab tablet 1 Tablet, Oral, 2 TIMES DAILY    spironolactone (ALDACTONE) 25 mg, Oral, DAILY        PHYSICAL EXAM  Physical Exam  Constitutional:       Appearance: Normal appearance. Cardiovascular:      Heart sounds: Normal heart sounds. Pulmonary:      Breath sounds: Normal breath sounds. Musculoskeletal:      Right lower leg: No edema. Left lower leg: No edema. Lymphadenopathy:      Cervical: No cervical adenopathy. Skin:     General: Skin is warm and dry. Findings: No rash. Neurological:      Mental Status: He is alert and oriented to person, place, and time. Psychiatric:         Mood and Affect: Mood normal.         Behavior: Behavior normal.         Thought Content:  Thought content normal.         Judgment: Judgment normal.         Assessment/Plan:     Diagnoses and all orders for this visit:    1. Controlled type 2 diabetes mellitus without complication, without long-term current use of insulin (HCC)  -     HEMOGLOBIN A1C W/O EAG    2. Hemoptysis  -     CBC WITH AUTOMATED DIFF  The patient denies any usman bloody sputum, given his hemoptysis while on long-term anticoagulation I am checking a CBC. 3. Essential hypertension  -     METABOLIC PANEL, COMPREHENSIVE  -     CBC WITH AUTOMATED DIFF    4. Mixed hyperlipidemia  -     LIPID PANEL    5. GET (obstructive sleep apnea)    6. Congestive heart failure, unspecified HF chronicity, unspecified heart failure type ([address]    7. Pulmonary fibrosis ([facility]    8. Oxygen dependent    9. Vitamin B12 deficiency    10. Vitamin D deficiency    11. Current use of long term anticoagulation    Other orders  -     empagliflozin (Jardiance) 25 mg tablet; Take 1 Tablet by mouth daily. Patient instructed to keep all follow-ups with cardiology as well as pulmonology. I am having him discontinue his Januvia I am starting him on Jardiance once a day. I instructed him to be certain that he increases his water intake and stays well-hydrated while on the medication. I expressed that I am very hopeful once he gets his oxygen he will start to develop more of an appetite as well as have an increase in his energy. Patient to follow-up with me in 3 months or sooner he verbalized understanding. 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 Follow-up and Dispositions    · Return in about 3 months (around 8/6/2022). Disclaimer:    I have discussed the diagnosis with the patient and the intended plan as seen above. The patient understands our medical plan.  The risks, benefits and significant side effects of all medications have been reviewed. Anticipated time course and progression of condition reviewed. All questions have been addressed. He received an after visit summary, with information reviewed, and questions answered. Where appropriate, he is instructed to call the clinic if he has not been notified either by phone or through [address] with the results of his tests or with an appointment plan for any referrals within 1 week(s). The patient  is to call if his condition worsens or fails to improve or if significant side effects are experienced.        Oziel Richardson, NP

## 2022-05-10 PROBLEM — R09.02 HYPOXEMIA: Status: ACTIVE | Noted: 2022-01-01

## 2022-05-10 PROBLEM — J84.10 PULMONARY FIBROSIS (HCC): Status: ACTIVE | Noted: 2022-01-01

## 2022-05-10 PROBLEM — J18.9 PNEUMONIA: Status: ACTIVE | Noted: 2022-01-01

## 2022-05-10 NOTE — PROGRESS NOTES
Rounding and VS completed. Resting quietly in bed w/visitor in room. Continues to deny c/o pain or discomfort. Will continue to monitor. CB in reach.

## 2022-05-10 NOTE — PROGRESS NOTES
Rounding and VS completed. Up in bed w/grand-daughter visiting. Warm washcloth provided for face and hands. Lunch tray set up and patient eating independently. Will continue to monitor CB in reach.

## 2022-05-10 NOTE — ED TRIAGE NOTES
To ER with increased dyspnea, reports is supposed to be placed on home oxygen, unsure of when oxygen to be delivered. Tonight SOB got worse. No chest pain.

## 2022-05-10 NOTE — ASSESSMENT & PLAN NOTE
This is a chronic problem  Patient sees a pulmonologist outpatient  DuoNeb as needed  Albuterol MDI as needed  Oxygen 2 L nasal cannula

## 2022-05-10 NOTE — PROGRESS NOTES
Patient's admission and current POC discussed with patient and family at bedside. Patient is alert and oriented x4 and continues to have significant hypoxia requiring 7 L high flow NC O2 and shortness of breath with cough. Patient with established history of severe pulmonary fibrosis and currently being managed by Dr. Kike Conde with outpatient oxygen currently being worked on per pulmonology. Slow improvement with IV antibiotics through the ED and counseled on current POC to continue IV antibiotics over the next 2 to 3 days. Patient acknowledges understanding of POC and agrees.

## 2022-05-10 NOTE — ASSESSMENT & PLAN NOTE
Patient will be placed on oxygen by nasal cannula 2 L while in hospital  Case management consult to arrange oxygen on discharge  Pulmonology consult

## 2022-05-10 NOTE — ED PROVIDER NOTES
Pt c/o sob, x weeks, worsening, can't ambulate more than few steps. H/o same, says due for home o2 but told would take 2-3 weeks. No fever. No sob. + cough w green/bloody sputum x few days. H/o same w pna in past.  Has had covid vaccines  Says h/o pulm fibrosis and chf. Also h /o cabg. On eliquis.   + n/v x one pta, no current nausea. Past Medical History:   Diagnosis Date    CAD (coronary artery disease)     Diabetes mellitus type 2, controlled ([address] 8/10/2020    Essential hypertension 8/10/2020    Mixed hyperlipidemia 8/10/2020       Past Surgical History:   Procedure Laterality Date    HX APPENDECTOMY      Appendicitis    HX HEENT      Cataracts  Intra Ocular Implants Normal pressure glaucom    HX IMPLANTABLE CARDIOVERTER DEFIBRILLATOR  2021    HX PACEMAKER      2021 defibrillator also have pacemaker    VASCULAR SURGERY PROCEDURE UNLIST      Quad. Bypass 2005, 3 stents         Family History:   Problem Relation Age of Onset    Cancer Brother         Cancer lymphoma    Cancer Brother         Lung cancer ([facility]. Exposed to [employer] [place])   [address]         CHF diabetes also went blind       Social History     Socioeconomic History    Marital status:      Spouse name: Not on file    Number of children: Not on file    Years of education: Not on file    Highest education level: Not on file   Occupational History    Not on file   Tobacco Use    Smoking status: Former Smoker     Packs/day: 1.00     Years: 25.00     Pack years: 25.00     Types: Cigarettes, Pipe, Cigars     Start date: 1965     Quit date: 1991     Years since quittin.3    Smokeless tobacco: Never Used   Vaping Use    Vaping Use: Never used   Substance and Sexual Activity    Alcohol use:  Yes     Alcohol/week: 0.0 standard drinks     Comment: One drink holidays and birthdays    Drug use: Never    Sexual activity: Not Currently     Partners: Female     Comment: Vasectomy   Other Topics Concern    Not on file   Social History Narrative    Not on file     Social Determinants of Health     Financial Resource Strain:     Difficulty of Paying Living Expenses: Not on file   Food Insecurity:     Worried About Running Out of Food in the Last Year: Not on file    [place] of Food in the Last Year: Not on file   Transportation Needs:     Lack of Transportation (Medical): Not on file    Lack of Transportation (Non-Medical): Not on file   Physical Activity:     Days of Exercise per Week: Not on file    Minutes of Exercise per Session: Not on file   Stress:     Feeling of Stress : Not on file   Social Connections:     Frequency of Communication with Friends and Family: Not on file    Frequency of Social Gatherings with Friends and Family: Not on file    Attends Confucianist Services: Not on file    Active Member of [address] [place] [address] or Organizations: Not on file    Attends Club or Organization Meetings: Not on file    Marital Status: Not on file   Intimate Partner Violence:     Fear of Current or Ex-Partner: Not on file    Emotionally Abused: Not on file    Physically Abused: Not on file    Sexually Abused: Not on file   Housing Stability:     Unable to Pay for Housing in the Last Year: Not on file    Number of [place] in the Last Year: Not on file    Unstable Housing in the Last Year: Not on file         ALLERGIES: Patient has no known allergies. Review of Systems   Constitutional: Negative for diaphoresis and fever. HENT: Negative for congestion. Respiratory: Positive for cough and shortness of breath. Cardiovascular: Negative for chest pain. Gastrointestinal: Negative for abdominal pain. Musculoskeletal: Negative for back pain. Skin: Negative for rash. Neurological: Negative for dizziness. All other systems reviewed and are negative.       Vitals:    05/09/22 2349 05/10/22 0005 05/10/22 0119   BP: (!) 116/59  (!) 115/49   Pulse: 69  61   Resp: 24  20 Temp: 98.3 °F (36.8 °C)     SpO2: (!) 82% (!) 82% 90%   Weight: 91 kg (200 lb 9.9 oz)     Height: 5' 8\" (1.727 m)              Physical Exam  Vitals and nursing note reviewed. Constitutional:       Appearance: He is well-developed. HENT:      Head: Normocephalic and atraumatic. Eyes:      Conjunctiva/sclera: Conjunctivae normal.   Cardiovascular:      Rate and Rhythm: Normal rate and regular rhythm. Pulmonary:      Effort: Pulmonary effort is normal.      Breath sounds: No wheezing. Abdominal:      Palpations: Abdomen is soft. Tenderness: There is no abdominal tenderness. Hernia: No hernia is present. Musculoskeletal:         General: No tenderness. Cervical back: Normal range of motion. Skin:     General: Skin is warm and dry. Capillary Refill: Capillary refill takes less than 2 seconds. Findings: No rash. Neurological:      Mental Status: He is alert and oriented to person, place, and time.    Psychiatric:         Mood and Affect: Mood normal.          MDM       Procedures      Vitals:  Patient Vitals for the past 12 hrs:   Temp Pulse Resp BP SpO2   05/10/22 0119  61 20 (!) 115/49 90 %   05/10/22 0005     (!) 82 %   05/09/22 2349 98.3 °F (36.8 °C) 69 24 (!) 116/59 (!) 82 %         Medications ordered:   Medications   cefTRIAXone (ROCEPHIN) 1 g in 0.9% sodium chloride (MBP/ADV) 50 mL MBP (has no administration in time range)   azithromycin (ZITHROMAX) 500 mg in 0.9% sodium chloride 250 mL (VIAL-MATE) (has no administration in time range)         Lab findings:  Recent Results (from the past 12 hour(s))   BLOOD GAS, ARTERIAL    Collection Time: 05/10/22 12:30 AM   Result Value Ref Range    pH 7.46 (H) 7.37 - 7.43      PCO2 27 (L) 35.0 - 45.0 mmHg    PO2 57 (L) 84 - 98 mmHg    O2 SAT 90 (L) 94 - 100 %    BICARBONATE 19 (L) 23.0 - 27.0 mmol/L    BASE DEFICIT 3.9 (H) 0.0 - 2.5 mmol/L    O2 METHOD Nasal Cannula      O2 FLOW RATE 2 L/min    FIO2 28.0 %    Sample source Arterial      SITE Left Radial      HOLDEN'S TEST PASS      Carboxy-Hgb 1.2 0 - 3 %    Methemoglobin 0.3 0 - 3 %    tHb 13.6 12.0 - 16.0 g/dL    Oxyhemoglobin 88.7 (LL) 90 - 100 %   COVID-19 RAPID TEST    Collection Time: 05/10/22 12:35 AM   Result Value Ref Range    COVID-19 rapid test Not Detected Not Detected     INFLUENZA A & B AG (RAPID TEST)    Collection Time: 05/10/22 12:35 AM   Result Value Ref Range    Influenza A Antigen Negative Negative      Influenza B Antigen Negative Negative     CBC WITH AUTOMATED DIFF    Collection Time: 05/10/22 12:55 AM   Result Value Ref Range    WBC 15.3 (H) 4.6 - 13.2 K/uL    RBC 4.56 4.35 - 5.65 M/uL    HGB 13.1 13.0 - 16.0 g/dL    HCT 40.2 36.0 - 48.0 %    MCV 88.2 78.0 - 100.0 FL    MCH 28.7 24.0 - 34.0 PG    MCHC 32.6 31.0 - 37.0 g/dL    RDW 14.3 11.6 - 14.5 %    PLATELET 351 779 - 450 K/uL    MPV 10.4 9.2 - 11.8 FL    NRBC 0.0 0.0  WBC    ABSOLUTE NRBC 0.00 0.00 - 0.01 K/uL    NEUTROPHILS 81 (H) 40 - 73 %    LYMPHOCYTES 8 (L) 21 - 52 %    MONOCYTES 7 3 - 10 %    EOSINOPHILS 2 0 - 5 %    BASOPHILS 1 0 - 2 %    IMMATURE GRANULOCYTES 1 (H) 0 - 0.5 %    ABS. NEUTROPHILS 12.5 (H) 1.8 - 8.0 K/UL    ABS. LYMPHOCYTES 1.2 0.9 - 3.6 K/UL    ABS. MONOCYTES 1.0 0.05 - 1.2 K/UL    ABS. EOSINOPHILS 0.3 0.0 - 0.4 K/UL    ABS. BASOPHILS 0.1 0.0 - 0.1 K/UL    ABS. IMM.  GRANS. 0.1 (H) 0.00 - 0.04 K/UL    DF AUTOMATED     METABOLIC PANEL, BASIC    Collection Time: 05/10/22 12:55 AM   Result Value Ref Range    Sodium 134 (L) 135 - 145 mmol/L    Potassium 4.0 3.2 - 5.1 mmol/L    Chloride 102 94 - 111 mmol/L    CO2 19 (L) 21 - 33 mmol/L    Anion gap 13 mmol/L    Glucose 157 (H) 70 - 110 mg/dL    BUN 17 9 - 21 mg/dL    Creatinine 1.00 0.8 - 1.50 mg/dL    BUN/Creatinine ratio 17      GFR est AA >60 ml/min/1.73m2    GFR est non-AA >60 ml/min/1.73m2    Calcium 9.4 8.5 - 10.5 mg/dL   BNP    Collection Time: 05/10/22 12:55 AM   Result Value Ref Range     (H) 0 - 100 pg/mL   LACTIC ACID Collection Time: 05/10/22 12:55 AM   Result Value Ref Range    Lactic acid 1.8 0.5 - 2.0 mmol/L           X-Ray, CT or other radiology findings or impressions:  XR CHEST PORT    (Results Pending)     Ekg: atrial paced rhythm at 60, axis 49, qrs 121, no sig st changes, q wave inf        Progress notes, Consult notes or additional Procedure notes:   1:38 AM hypoxic, sig productive cough w elev wbc. Cx's/abx started, to admit      Diagnosis:   1. Hypoxemia    2. Pneumonia of left lung due to infectious organism, unspecified part of lung        Disposition: admit    Follow-up Information    None          Patient's Medications   Start Taking    No medications on file   Continue Taking    ALBUTEROL (PROVENTIL HFA, VENTOLIN HFA, PROAIR HFA) 90 MCG/ACTUATION INHALER    Take 1 Puff by inhalation every four (4) hours as needed for Wheezing. AMIODARONE (CORDARONE) 200 MG TABLET    Take 1 Tablet by mouth daily. APIXABAN (ELIQUIS) 5 MG TABLET    Take 1 Tablet by mouth two (2) times a day. ASPIRIN DELAYED-RELEASE 81 MG TABLET    Take  by mouth daily. ATORVASTATIN (LIPITOR) 20 MG TABLET    Take 1 Tablet by mouth daily. BENZONATATE (TESSALON) 100 MG CAPSULE    Take 100 mg by mouth three (3) times daily as needed for Cough. BRIMONIDINE-TIMOLOL (COMBIGAN) 0.2-0.5 % DROP OPHTHALMIC SOLUTION    Administer 1 Drop to both eyes every twelve (12) hours. CARVEDILOL (COREG) 12.5 MG TABLET    Take 1 Tablet by mouth two (2) times daily (with meals). CHOLECALCIFEROL (VITAMIN D3) 25 MCG (1,000 UNIT) CAP    Take 1,000 Units by mouth daily. CYANOCOBALAMIN (VITAMIN B-12) 1,000 MCG TABLET    Take 1,000 mcg by mouth daily. DOCUSATE SODIUM (COLACE) 100 MG CAPSULE    Take 100 mg by mouth two (2) times a day. EMPAGLIFLOZIN (JARDIANCE) 25 MG TABLET    Take 1 Tablet by mouth daily. FUROSEMIDE (LASIX) 20 MG TABLET    Take 20 mg by mouth daily.  Monday, Wednesday, Friday's, 1/2 tablet    LATANOPROST (XALATAN) 0.005 % OPHTHALMIC SOLUTION    Administer 1 Drop to both eyes nightly. METFORMIN (GLUCOPHAGE) 500 MG TABLET    TAKE 2 TABLETS TWICE A DAY WITH MEALS    MODAFINIL (PROVIGIL) 200 MG TABLET    Take 1 Tablet by mouth daily. Max Daily Amount: 200 mg. NITROGLYCERIN (NITROSTAT) 0.4 MG SL TABLET    0.4 mg by SubLINGual route every five (5) minutes as needed for Chest Pain. Up to 3 doses. NYSTATIN (MYCOSTATIN) TOPICAL CREAM    Apply  to affected area two (2) times a day. SACUBITRIL-VALSARTAN (ENTRESTO) 24-26 MG TABLET    Take 1 Tablet by mouth two (2) times a day. SACUBITRIL-VALSARTAN (ENTRESTO) 49-51 MG TAB TABLET    Take 1 Tablet by mouth two (2) times a day. SPIRONOLACTONE (ALDACTONE) 25 MG TABLET    Take 1 Tablet by mouth daily.    These Medications have changed    No medications on file   Stop Taking    No medications on file

## 2022-05-10 NOTE — ROUTINE PROCESS
TRANSFER - OUT REPORT:    Verbal report given to Davy Estrada on [id number]  being transferred to ICU for routine progression of care       Report consisted of patients Situation, Background, Assessment and   Recommendations(SBAR). Information from the following report(s) SBAR and ED Summary was reviewed with the receiving nurse. Lines:   Saline Lock 05/10/22 Posterior;Right Hand (Active)        Opportunity for questions and clarification was provided.       Patient transported with:   Monitor  O2 @ 2 liters  Tech

## 2022-05-10 NOTE — PROGRESS NOTES
Care Management Interventions  PCP Verified by CM: Yes  Last Visit to PCP: 05/06/22  Palliative Care Criteria Met (RRAT>21 & CHF Dx)?: No  Mode of Transport at Discharge: Self  Transition of Care Consult (CM Consult): Discharge Planning  Physical Therapy Consult: No  Occupational Therapy Consult: No  Speech Therapy Consult: No  Support Systems: Spouse/Significant Other,Child(henrik)  Confirm Follow Up Transport: Self  The Plan for Transition of Care is Related to the Following Treatment Goals : [address] with transition to the appropriate post acute services. Pt states he would like [facility]. The Patient and/or Patient Representative was Provided with a Choice of Provider and Agrees with the Discharge Plan?: Yes  Name of the Patient Representative Who was Provided with a Choice of Provider and Agrees with the Discharge Plan: pt  Giacomo Marietta  [place] of Choice List was Provided with Basic Dialogue that Supports the Patient's Individualized Plan of Care/Goals, Treatment Preferences and Shares the Quality Data Associated with the Providers?: Yes  Discharge Location  Patient Expects to be Discharged to[de-identified] Home with home health   Reason for Admission:  Chart reviewed and pt interviewed at bedside. Adm note for CC of shortness and hypoxia. ED evaluation revealed a left-sided pneumonia. He was ordered home oxygen on 5/6/2022 by Dr. Ravinder Sánchez office with the expectation to receive it the week of May 9th. PMH:CAD, DM, Ess HTN, & mixed Hyperlipidemia. PSH:  Appendectomy, HEENT, Implantable Cardioverter Defibrillator, Pacemaker,  Vascular Surgery procedure, Quad Bypass, 2005. DX:  DM type 2, Essential HTN, Hypoxemia, Mixed HLD, Pneumonia, Pulmonary Fibrosis. RUR Score:    8 %           Plan for utilizing home health:    Currently wife is followed by Maude Avila and he would like to go on service as well.       PCP: First and Last name:  Irais Horn NP     Name of Practice: Family Practice   Are you a current patient: Yes/No: Y   Approximate date of last visit: 05/06/2022   Can you participate in a virtual visit with your PCP:  Y                    Current Advanced Directive/Advance Care Plan: Full Code      Healthcare Decision Maker:   Click here to complete [address] including selection of the Healthcare Decision Maker Relationship (ie \"Primary\")Polo Hansen, [phone] ( home and cell same)           Transition of Care Plan:    Discharge planning with transition of care home with the approp post-acute services. Provide education by [employer], and medication will be reconsiled. LISANDRA called Dr. Raza Ra office, [facility], and was told by his nurse that oxygen has been ordered for the pt with delivery time the week May 9th. They also said that the oxygen order was sent to [place] [id number].

## 2022-05-10 NOTE — PROGRESS NOTES
Problem: Falls - Risk of  Goal: *Absence of Falls  Description: Document Dusty Gonzalez Fall Risk and appropriate interventions in the flowsheet.   Outcome: Progressing Towards Goal  Note: Fall Risk Interventions:            Medication Interventions: Patient to call before getting OOB,Teach patient to arise slowly

## 2022-05-10 NOTE — ASSESSMENT & PLAN NOTE
Left-sided infiltrate on chest x-ray  Oxygen at 2 L nasal cannula  Azithromycin and Rocephin daily  DuoNebs as needed

## 2022-05-10 NOTE — PROGRESS NOTES
conducted an initial consultation and Spiritual Assessment for [facility], who is a 68 y.o.,male. The reason the Patient came to the hospital is:   Patient Active Problem List    Diagnosis Date Noted    Hypoxemia 05/10/2022    Pneumonia 05/10/2022    Pulmonary fibrosis ([facility] 05/10/2022    Essential hypertension 08/10/2020    Mixed hyperlipidemia 08/10/2020    Diabetes mellitus type 2, controlled ([facility] 08/10/2020        The  provided the following Interventions:  Initiated a relationship of care and support. Explored issues of cheri, spirituality and/or Christian needs while hospitalized. Listened empathically. Provided chaplaincy education. Provided information about Spiritual Care Services. Offered prayer and assurance of continued prayers on patient's behalf. Chart reviewed. The following outcomes were achieved:  Patient shared some information about their medical narrative and spiritual journey/beliefs. Patient processed feeling about current hospitalization. Patient expressed gratitude for the 's visit. Assessment:  Patient did not indicate any spiritual or Christian issues which require Spiritual Care Services interventions at this time. Patient does not have any Christian/cultural needs that will affect patients preferences in health care. Plan:  Chaplains will continue to follow and will provide pastoral care on an as needed or requested basis.  recommends bedside caregivers page  on duty if patient shows signs of acute spiritual or emotional distress. Per patient, doing well, some concerns about health but overwhelmed. Does not have advance directive. Patient in good spirits.  provided prayer of comfort.      [address] [facility]   Staff [address] [facility]   [phone]

## 2022-05-10 NOTE — PROGRESS NOTES
Rounding, assessment, VS and white board updated. O2 Sats decreased w/O2 increased from 4 to 6LPM w/o improvement. RT aware w/Hi-brandi O2 placed @ 10LPM. Sats increased to 96%. Lungs diminished at bases. Productive cough with thick yellow phlegm expectorated. Warm washcloth provided for face/hands and set up patient for oral care. Dentures cleaned and hearing aids applied independently. Denies c/o pain or discomfort at this time. Safety measures in place. Bed low/locked, SRx2, CB and all possessions in reach. Will continue to monitor.

## 2022-05-10 NOTE — ROUTINE PROCESS
Bedside shift change report given to PAULA Gunn RN (oncoming nurse) by Phyllis Chahal LPN (offgoing nurse). Report included the following information SBAR, Procedure Summary, Intake/Output, MAR, Recent Results, Med Rec Status and Quality Measures. 2100  O2 sats dropping and sustaining in the low 80;s. O2 turned up to 8L O2 via [place]. 2200  Pt continues to cough and desat. O2 turned up to 10L. O2 sat 85%. RT aware. 2230  Pt coughing and desatting to high 70's. [place] increased to 15L. RT aware. 2250  RT placed a NRB on t for sats in the low to mid 80's.     2330  Called provider and spoke with her about concerns of pt's O2 sats that are 84-88% on 15L NRB and respirations high 20's and low 30s. Provider will come to unit to see pt.     2335  Provider ordered an ABG after speaking to pt about possibly intubating pt. Pt stated he would be OK with intubation. 2345  RT obtainged an AG on pt.     2358  Tylenol given for an axillary temp of 101.1f.     0008  Chest x ray being taken at this time. #20 gauge PIV placed in L FA. Good blood return. Provider stated she would call Deshaun Sepulveda (daughter and update her on the current situation). 0045  Pt placed on BiPAP. Settings are: 2/8, 75%. Pt stated he feels better now. Pt changed to ICU status. 0130  16 fr duke placed with 500ml of clear fartun urine returned. Specimen obtained and taken to the lab for a UA and culture. 0345  Lab in to draw blood for AM labs. Pt tolerated well. ABX hung and infusing per order. BiPAP alarming due to mask not fitting properly. RT has attempted numerous times and changed mask to help with leak without success. Bed in lowest position and CBWR.     1206  Called provider and advised pt's magnesium was 1.3. New order for 2 grams mag was received. 1119  Bedside shift change report given to BASILIO Booth RN (oncoming nurse) by PAULA Gunn RN (offgoing nurse).  Report included the following information SBAR, Intake/Output, MAR, Recent Results, Med Rec Status and Quality Measures.

## 2022-05-10 NOTE — H&P
History and Physical    Subjective:     Efraín Farias is a 68 y.o. male  has a past medical history of CAD (coronary artery disease), Diabetes mellitus type 2, controlled ([address] (8/10/2020), Essential hypertension (8/10/2020), and Mixed hyperlipidemia (8/10/2020). Patient seen at bedside emergency department. Patient came to the emergency room tonight for shortness of breath and hypoxia. Patient was supposed to have oxygen delivered to his home and he said is not supposed to be there for about 2 weeks. Patient has a history of pulmonary fibrosis and has been having an increase in shortness of breath. No chest pain has been reported. In the emergency room patient was found to have a left-sided pneumonia treated with Rocephin and azithromycin. Patient will be admitted to telemetry on oxygen he will be treated for his pneumonia with Rocephin and azithromycin. A.m. labs Case management consult for oxygen on discharge from the hospital.  Cardiology consult and pulmonary consult. Past Medical History:   Diagnosis Date    CAD (coronary artery disease)     Diabetes mellitus type 2, controlled ([address] 8/10/2020    Essential hypertension 8/10/2020    Mixed hyperlipidemia 8/10/2020      Past Surgical History:   Procedure Laterality Date    HX APPENDECTOMY  1986    Appendicitis    HX HEENT      Cataracts 2005 Intra Ocular Implants Normal pressure glaucom    HX IMPLANTABLE CARDIOVERTER DEFIBRILLATOR  02/22/2021    HX PACEMAKER      2/22/2021 defibrillator also have pacemaker    VASCULAR SURGERY PROCEDURE UNLIST      Quad. Bypass 2005, 3 stents     Family History   Problem Relation Age of Onset    Cancer Brother         Cancer lymphoma    Cancer Brother         Lung cancer ([address]  Exposed to Agent [place])   [address]         CHF diabetes also went blind      Social History     Tobacco Use    Smoking status: Former Smoker     Packs/day: 1.00     Years: 25.00     Pack years: 25.00     Types: Cigarettes, Pipe, Cigars     Start date: 1965     Quit date: 1991     Years since quittin.3    Smokeless tobacco: Never Used   Substance Use Topics    Alcohol use: Yes     Alcohol/week: 0.0 standard drinks     Comment: One drink holidays and birthdays       Prior to Admission medications    Medication Sig Start Date End Date Taking? Authorizing Provider   cholecalciferol (VITAMIN D3) 25 mcg (1,000 unit) cap Take 1,000 Units by mouth daily. Yes Provider, Historical   cyanocobalamin (Vitamin B-12) 1,000 mcg tablet Take 1,000 mcg by mouth daily. Yes Provider, Historical   benzonatate (TESSALON) 100 mg capsule Take 100 mg by mouth three (3) times daily as needed for Cough. Yes Provider, Historical   brimonidine-timoloL (Combigan) 0.2-0.5 % drop ophthalmic solution Administer 1 Drop to both eyes every twelve (12) hours. Yes Provider, Historical   empagliflozin (Jardiance) 25 mg tablet Take 1 Tablet by mouth daily. 22  Yes Neo Green NP   modafiniL (PROVIGIL) 200 mg tablet Take 1 Tablet by mouth daily. Max Daily Amount: 200 mg. 22  Yes Neo Green NP   apixaban (Eliquis) 5 mg tablet Take 1 Tablet by mouth two (2) times a day. 22  Yes Neo Green NP   amiodarone (CORDARONE) 200 mg tablet Take 1 Tablet by mouth daily. 22  Yes Neo Green NP   atorvastatin (LIPITOR) 20 mg tablet Take 1 Tablet by mouth daily. 22  Yes Neo Green NP   carvediloL (COREG) 12.5 mg tablet Take 1 Tablet by mouth two (2) times daily (with meals). 22  Yes Neo Green NP   sacubitriL-valsartan Yo [employer]chure) 24-26 mg tablet Take 1 Tablet by mouth two (2) times a day. Patient taking differently: TAKE 1 TABLET BY ORAL ROUTE IN THE MORNING AND 2 IN THE EVENING 22  Yes eNo Green NP   spironolactone (ALDACTONE) 25 mg tablet Take 1 Tablet by mouth daily.  10/29/21  Yes Neo Geren NP   metFORMIN (GLUCOPHAGE) 500 mg tablet TAKE 2 TABLETS TWICE A DAY WITH MEALS  Patient taking differently: 500 mg. 1 tablet in the morning and 1 tablet at night 11/9/20  Yes Tish Salinas NP   aspirin delayed-release 81 mg tablet Take  by mouth daily. Yes Provider, Historical   docusate sodium (COLACE) 100 mg capsule Take 100 mg by mouth two (2) times a day. Yes Provider, Historical   furosemide (LASIX) 20 mg tablet Take 20 mg by mouth daily. Monday, Wednesday, Friday's, 1/2 tablet   Yes Provider, Historical   latanoprost (XALATAN) 0.005 % ophthalmic solution Administer 1 Drop to both eyes nightly. Yes Provider, Historical   sacubitriL-valsartan (Entresto) 49-51 mg tab tablet Take 1 Tablet by mouth two (2) times a day. Provider, Historical   nystatin (MYCOSTATIN) topical cream Apply  to affected area two (2) times a day. 11/11/21   Tish Salinas NP   albuterol (PROVENTIL HFA, VENTOLIN HFA, PROAIR HFA) 90 mcg/actuation inhaler Take 1 Puff by inhalation every four (4) hours as needed for Wheezing. Patient not taking: Reported on 5/6/2022 12/17/20   Tish Salinas NP   nitroglycerin (Nitrostat) 0.4 mg SL tablet 0.4 mg by SubLINGual route every five (5) minutes as needed for Chest Pain. Up to 3 doses. Provider, Historical     No Known Allergies      Review of Systems   Constitutional: Negative for chills and fever. Respiratory: Positive for cough, sputum production and shortness of breath. Negative for hemoptysis and wheezing. Cardiovascular: Negative for chest pain and leg swelling. Neurological: Negative for dizziness. All other systems reviewed and are negative. Objective:   VITALS:    Visit Vitals  BP 95/66 (BP 1 Location: Left upper arm)   Pulse 66   Temp 97.9 °F (36.6 °C)   Resp 21   Ht 5' 8\" (1.727 m)   Wt 87.9 kg (193 lb 12.8 oz)   SpO2 91%   BMI 29.47 kg/m²       Physical Exam  Vitals and nursing note reviewed. Constitutional:       Appearance: He is well-developed. He is ill-appearing. HENT:      Head: Normocephalic and atraumatic.       Nose: Nose normal.   Eyes: Conjunctiva/sclera: Conjunctivae normal.      Pupils: Pupils are equal, round, and reactive to light. Cardiovascular:      Rate and Rhythm: Normal rate and regular rhythm. Pulses: Normal pulses. Heart sounds: Normal heart sounds. Pulmonary:      Effort: Pulmonary effort is normal. No respiratory distress. Breath sounds: No stridor. Rhonchi present. Comments: Few scattered faint rhonchi  Abdominal:      General: Bowel sounds are normal. There is no distension. Palpations: Abdomen is soft. Tenderness: There is no abdominal tenderness. Musculoskeletal:         General: Normal range of motion. Cervical back: Normal range of motion and neck supple. Right lower leg: No edema. Left lower leg: No edema. Skin:     General: Skin is warm and dry. Neurological:      Mental Status: He is alert and oriented to person, place, and time. Psychiatric:         Behavior: Behavior normal.         Thought Content:  Thought content normal.         Judgment: Judgment normal.         _______________________________________________________________________  Care Plan discussed with:    Comments   Patient X    Family      RN X    Care Manager                    Consultant:      _______________________________________________________________________  Expected  Disposition:   Home with Family X   HH/PT/OT/RN    SNF/LTC    MARIE    ________________________________________________________________________  TOTAL TIME:  48 Minutes    Critical Care Provided     Minutes non procedure based      Comments    X Reviewed previous records   >50% of visit spent in counseling and coordination of care X Discussion with patient and/or family and questions answered       ________________________________________________________________________    Labs:  Recent Results (from the past 24 hour(s))   BLOOD GAS, ARTERIAL    Collection Time: 05/10/22 12:30 AM   Result Value Ref Range    pH 7.46 (H) 7.37 - 7.43 PCO2 27 (L) 35.0 - 45.0 mmHg    PO2 57 (L) 84 - 98 mmHg    O2 SAT 90 (L) 94 - 100 %    BICARBONATE 19 (L) 23.0 - 27.0 mmol/L    BASE DEFICIT 3.9 (H) 0.0 - 2.5 mmol/L    O2 METHOD Nasal Cannula      O2 FLOW RATE 2 L/min    FIO2 28.0 %    Sample source Arterial      SITE Left Radial      HOLDEN'S TEST PASS      Carboxy-Hgb 1.2 0 - 3 %    Methemoglobin 0.3 0 - 3 %    tHb 13.6 12.0 - 16.0 g/dL    Oxyhemoglobin 88.7 (LL) 90 - 100 %   COVID-19 RAPID TEST    Collection Time: 05/10/22 12:35 AM   Result Value Ref Range    COVID-19 rapid test Not Detected Not Detected     INFLUENZA A & B AG (RAPID TEST)    Collection Time: 05/10/22 12:35 AM   Result Value Ref Range    Influenza A Antigen Negative Negative      Influenza B Antigen Negative Negative     CBC WITH AUTOMATED DIFF    Collection Time: 05/10/22 12:55 AM   Result Value Ref Range    WBC 15.3 (H) 4.6 - 13.2 K/uL    RBC 4.56 4.35 - 5.65 M/uL    HGB 13.1 13.0 - 16.0 g/dL    HCT 40.2 36.0 - 48.0 %    MCV 88.2 78.0 - 100.0 FL    MCH 28.7 24.0 - 34.0 PG    MCHC 32.6 31.0 - 37.0 g/dL    RDW 14.3 11.6 - 14.5 %    PLATELET 047 770 - 589 K/uL    MPV 10.4 9.2 - 11.8 FL    NRBC 0.0 0.0  WBC    ABSOLUTE NRBC 0.00 0.00 - 0.01 K/uL    NEUTROPHILS 81 (H) 40 - 73 %    LYMPHOCYTES 8 (L) 21 - 52 %    MONOCYTES 7 3 - 10 %    EOSINOPHILS 2 0 - 5 %    BASOPHILS 1 0 - 2 %    IMMATURE GRANULOCYTES 1 (H) 0 - 0.5 %    ABS. NEUTROPHILS 12.5 (H) 1.8 - 8.0 K/UL    ABS. LYMPHOCYTES 1.2 0.9 - 3.6 K/UL    ABS. MONOCYTES 1.0 0.05 - 1.2 K/UL    ABS. EOSINOPHILS 0.3 0.0 - 0.4 K/UL    ABS. BASOPHILS 0.1 0.0 - 0.1 K/UL    ABS. IMM.  GRANS. 0.1 (H) 0.00 - 0.04 K/UL    DF AUTOMATED     METABOLIC PANEL, BASIC    Collection Time: 05/10/22 12:55 AM   Result Value Ref Range    Sodium 134 (L) 135 - 145 mmol/L    Potassium 4.0 3.2 - 5.1 mmol/L    Chloride 102 94 - 111 mmol/L    CO2 19 (L) 21 - 33 mmol/L    Anion gap 13 mmol/L    Glucose 157 (H) 70 - 110 mg/dL    BUN 17 9 - 21 mg/dL    Creatinine 1.00 0.8 - 1.50 mg/dL    BUN/Creatinine ratio 17      GFR est AA >60 ml/min/1.73m2    GFR est non-AA >60 ml/min/1.73m2    Calcium 9.4 8.5 - 10.5 mg/dL   BNP    Collection Time: 05/10/22 12:55 AM   Result Value Ref Range     (H) 0 - 100 pg/mL   LACTIC ACID    Collection Time: 05/10/22 12:55 AM   Result Value Ref Range    Lactic acid 1.8 0.5 - 2.0 mmol/L   TROPONIN I    Collection Time: 05/10/22 12:55 AM   Result Value Ref Range    Troponin-I, Qt. <0.02 (L) 0.02 - 0.05 ng/mL       Imaging:  Left infiltrate    Assessment & Plan:       Diabetes mellitus type 2, controlled ([address]  This is a chronic problem  Sliding scale AC at bedtime  Hold oral hypoglycemics  Diabetic diet    Essential hypertension  This is a chronic problem  Continue Aldactone, Coreg    Hypoxemia  Patient will be placed on oxygen by nasal cannula 2 L while in hospital  Case management consult to arrange oxygen on discharge  Pulmonology consult    Mixed hyperlipidemia  This is a chronic problem  Continue Lipitor    Pneumonia  Left-sided infiltrate on chest x-ray  Oxygen at 2 L nasal cannula  Azithromycin and Rocephin daily  DuoNebs as needed    Pulmonary fibrosis ([address]  This is a chronic problem  Patient sees a pulmonologist outpatient  DuoNeb as needed  Albuterol MDI as needed  Oxygen 2 L nasal cannula        Code Status: Full    Prophylaxis: Patient on Eliquis        Electronically Signed : Gerard Alberto ENP-C, FNP-C, [address] voice recognition was used to generate this report, which may have resulted in some phonetic based errors in grammar and contents.  Even though attempts were made to correct all the mistakes, some may have been missed, and remained in the body of the document

## 2022-05-10 NOTE — CONSULTS
CONSULTATION    REASON FOR CONSULT:  Acute respiratory failure    REQUESTING PROVIDER:  Thor Odom NP    CHIEF COMPLAINT:    Chief Complaint   Patient presents with    Shortness of Breath         HISTORY OF PRESENT ILLNESS:  Juana Tucker is a 68y.o. year-old male with past medical history significant for idiopathic pulmonary fibrosis, CAD with CABG 2005 and PCI x2 2011, ischemic cardiomyopathy with last EF 40% and ICD/PPM, DM, hypertension, and hyperlipidemia who presented to ED for evaluation of worsening shortness of breath that prevented him from ambulating more than a few steps. He reports seeing his pulmonologist last week and was told that he would need to start on home oxygen therapy. He had not received the home O2 yet. He last saw cardiology 4/16/22 and is being evaluated for possible myocardial amyloid. He also reports that he takes care of his bed bound wife at home, and he has been unable to care for her without resting frequently. He endorses hemoptysis and an increasingly productive cough. He denies any chest pain. He is currently on 7L mid-flow oxygen and is being treated for pneumonia with azithromycin. He offers no additional complaints today. He is followed by Dr. Krystal Strong with [address] [place] [address]. Records from hospital admission course thus far reviewed.       Telemetry Review: atrial pacing; 60; occasional PVC      PAST MEDICAL HISTORY:    Past Medical History:   Diagnosis Date    CAD (coronary artery disease)     Diabetes mellitus type 2, controlled ([facility] [address] 8/10/2020    Essential hypertension 8/10/2020    Mixed hyperlipidemia 8/10/2020       PAST SURGICAL HISTORY:   Past Surgical History:   Procedure Laterality Date    HX APPENDECTOMY  1986    Appendicitis    HX HEENT      Cataracts 2005 Intra Ocular Implants Normal pressure glaucom    HX IMPLANTABLE CARDIOVERTER DEFIBRILLATOR  02/22/2021    HX PACEMAKER      2/22/2021 defibrillator also have pacemaker    VASCULAR SURGERY PROCEDURE UNLIST      Quad. Bypass 2005, 3 stents       ALLERGIES:  No Known Allergies    FAMILY HISTORY:    Family History   Problem Relation Age of Onset    Cancer Brother         Cancer lymphoma    Cancer Brother         Lung cancer ([address] Exposed to [employer])   [address]         CHF diabetes also went blind       SOCIAL HISTORY:    Tobacco: Former smoker  Drugs: Denies  ETOH: Rare    HOME MEDICATIONS:    Prior to Admission Medications   Prescriptions Last Dose Informant Patient Reported? Taking? albuterol (PROVENTIL HFA, VENTOLIN HFA, PROAIR HFA) 90 mcg/actuation inhaler Unknown at Unknown time  No No   Sig: Take 1 Puff by inhalation every four (4) hours as needed for Wheezing. Patient not taking: Reported on 5/6/2022   amiodarone (CORDARONE) 200 mg tablet 5/9/2022 at Unknown time  No Yes   Sig: Take 1 Tablet by mouth daily. apixaban (Eliquis) 5 mg tablet 5/9/2022 at Unknown time  No Yes   Sig: Take 1 Tablet by mouth two (2) times a day. aspirin delayed-release 81 mg tablet 5/9/2022 at Unknown time  Yes Yes   Sig: Take  by mouth daily. atorvastatin (LIPITOR) 20 mg tablet 5/9/2022 at Unknown time  No Yes   Sig: Take 1 Tablet by mouth daily. benzonatate (TESSALON) 100 mg capsule 5/9/2022 at Unknown time  Yes Yes   Sig: Take 100 mg by mouth three (3) times daily as needed for Cough. brimonidine-timoloL (Combigan) 0.2-0.5 % drop ophthalmic solution 5/9/2022 at Unknown time  Yes Yes   Sig: Administer 1 Drop to both eyes every twelve (12) hours. carvediloL (COREG) 12.5 mg tablet 5/9/2022 at Unknown time  No Yes   Sig: Take 1 Tablet by mouth two (2) times daily (with meals). cholecalciferol (VITAMIN D3) 25 mcg (1,000 unit) cap 5/9/2022 at Unknown time  Yes Yes   Sig: Take 1,000 Units by mouth daily. cyanocobalamin (Vitamin B-12) 1,000 mcg tablet 5/9/2022 at Unknown time  Yes Yes   Sig: Take 1,000 mcg by mouth daily.    docusate sodium (COLACE) 100 mg capsule 2022 at Unknown time  Yes Yes   Sig: Take 100 mg by mouth two (2) times a day. empagliflozin (Jardiance) 25 mg tablet 2022 at Unknown time  No Yes   Sig: Take 1 Tablet by mouth daily. furosemide (LASIX) 20 mg tablet 2022 at Unknown time  Yes Yes   Sig: Take 20 mg by mouth daily. Monday, Wednesday, Friday's, 1/2 tablet   latanoprost (XALATAN) 0.005 % ophthalmic solution 2022 at Unknown time  Yes Yes   Sig: Administer 1 Drop to both eyes nightly. metFORMIN (GLUCOPHAGE) 500 mg tablet 2022 at Unknown time  No Yes   Sig: TAKE 2 TABLETS TWICE A DAY WITH MEALS   Patient taking differently: 500 mg. 1 tablet in the morning and 1 tablet at night   modafiniL (PROVIGIL) 200 mg tablet 2022 at Unknown time  No Yes   Sig: Take 1 Tablet by mouth daily. Max Daily Amount: 200 mg.   nitroglycerin (Nitrostat) 0.4 mg SL tablet Unknown at Unknown time  Yes No   Si.4 mg by SubLINGual route every five (5) minutes as needed for Chest Pain. Up to 3 doses. nystatin (MYCOSTATIN) topical cream Unknown at Unknown time  No No   Sig: Apply  to affected area two (2) times a day. sacubitriL-valsartan (Entresto) 24-26 mg tablet 2022 at Unknown time  No Yes   Sig: Take 1 Tablet by mouth two (2) times a day. Patient taking differently: TAKE 1 TABLET BY ORAL ROUTE IN THE MORNING AND 2 IN THE EVENING   sacubitriL-valsartan (Entresto) 49-51 mg tab tablet   Yes No   Sig: Take 1 Tablet by mouth two (2) times a day. spironolactone (ALDACTONE) 25 mg tablet 2022 at Unknown time  No Yes   Sig: Take 1 Tablet by mouth daily. Facility-Administered Medications: None       REVIEW OF SYSTEMS:  Complete review of systems performed, pertinents noted above, all other systems are negative.     Patient Vitals for the past 24 hrs:   Temp Pulse Resp BP SpO2   05/10/22 1022  63      05/10/22 0800  65      05/10/22 0759     93 %   05/10/22 0748     (!) 89 %   05/10/22 0740 98.7 °F (37.1 °C) 65 22 102/63 (!) 83 %   05/10/22 0335 97.9 °F (36.6 °C) 66 21 95/66    05/10/22 0207  62 20 123/67 91 %   05/10/22 0119  61 20 (!) 115/49 90 %   05/10/22 0005     (!) 82 %   05/09/22 2349 98.3 °F (36.8 °C) 69 24 (!) 116/59 (!) 82 %       PHYSICAL EXAMINATION:    General: Well nourished, NAD, A&O  HEENT: Normocephalic, PERRL, no drainage  Neck: Supple, Trachea midline, No JVD  RESP: CTA bilaterally, diminshed.  + Symmetrical chest  movement. No SOB or distress. On 7L mid-flow NC  Cardiovascular: RRR no MRG  PVS: No rubor, cyanosis, no edema, Radial, DP, PT pulses equal bilaterally  ABD:  soft, NT, Normoactive BS  Derm: Warm/Dry/Intact with no lesions  Neuro: A&O PPTS, cranial nerves II- XII grossly intact via interaction with patient. No focal deficits  PSYCH: No anxiety or agitation      Electrocardiogram performed earlier reviewed, it shows    Atrial-paced complexes   IVCD, consider atypical RBBB   Inferior infarct, old   Consider anterior infarct             Recent labs results and imaging reviewed.       Recent Results (from the past 24 hour(s))   BLOOD GAS, ARTERIAL    Collection Time: 05/10/22 12:30 AM   Result Value Ref Range    pH 7.46 (H) 7.37 - 7.43      PCO2 27 (L) 35.0 - 45.0 mmHg    PO2 57 (L) 84 - 98 mmHg    O2 SAT 90 (L) 94 - 100 %    BICARBONATE 19 (L) 23.0 - 27.0 mmol/L    BASE DEFICIT 3.9 (H) 0.0 - 2.5 mmol/L    O2 METHOD Nasal Cannula      O2 FLOW RATE 2 L/min    FIO2 28.0 %    Sample source Arterial      SITE Left Radial      HOLDEN'S TEST PASS      Carboxy-Hgb 1.2 0 - 3 %    Methemoglobin 0.3 0 - 3 %    tHb 13.6 12.0 - 16.0 g/dL    Oxyhemoglobin 88.7 (LL) 90 - 100 %   COVID-19 RAPID TEST    Collection Time: 05/10/22 12:35 AM   Result Value Ref Range    COVID-19 rapid test Not Detected Not Detected     INFLUENZA A & B AG (RAPID TEST)    Collection Time: 05/10/22 12:35 AM   Result Value Ref Range    Influenza A Antigen Negative Negative      Influenza B Antigen Negative Negative     EKG, 12 LEAD, INITIAL    Collection Time: 05/10/22 12:43 AM   Result Value Ref Range    Ventricular Rate 60 BPM    Atrial Rate 60 BPM    P-R Interval 203 ms    QRS Duration 121 ms    Q-T Interval 481 ms    QTC Calculation (Bezet) 481 ms    Calculated P Axis -159 degrees    Calculated R Axis 49 degrees    Calculated T Axis 45 degrees    Diagnosis       Atrial-paced complexes  IVCD, consider atypical RBBB  Inferior infarct, old  Consider anterior infarct  Lateral leads are also involved     CBC WITH AUTOMATED DIFF    Collection Time: 05/10/22 12:55 AM   Result Value Ref Range    WBC 15.3 (H) 4.6 - 13.2 K/uL    RBC 4.56 4.35 - 5.65 M/uL    HGB 13.1 13.0 - 16.0 g/dL    HCT 40.2 36.0 - 48.0 %    MCV 88.2 78.0 - 100.0 FL    MCH 28.7 24.0 - 34.0 PG    MCHC 32.6 31.0 - 37.0 g/dL    RDW 14.3 11.6 - 14.5 %    PLATELET 521 684 - 222 K/uL    MPV 10.4 9.2 - 11.8 FL    NRBC 0.0 0.0  WBC    ABSOLUTE NRBC 0.00 0.00 - 0.01 K/uL    NEUTROPHILS 81 (H) 40 - 73 %    LYMPHOCYTES 8 (L) 21 - 52 %    MONOCYTES 7 3 - 10 %    EOSINOPHILS 2 0 - 5 %    BASOPHILS 1 0 - 2 %    IMMATURE GRANULOCYTES 1 (H) 0 - 0.5 %    ABS. NEUTROPHILS 12.5 (H) 1.8 - 8.0 K/UL    ABS. LYMPHOCYTES 1.2 0.9 - 3.6 K/UL    ABS. MONOCYTES 1.0 0.05 - 1.2 K/UL    ABS. EOSINOPHILS 0.3 0.0 - 0.4 K/UL    ABS. BASOPHILS 0.1 0.0 - 0.1 K/UL    ABS. IMM.  GRANS. 0.1 (H) 0.00 - 0.04 K/UL    DF AUTOMATED     METABOLIC PANEL, BASIC    Collection Time: 05/10/22 12:55 AM   Result Value Ref Range    Sodium 134 (L) 135 - 145 mmol/L    Potassium 4.0 3.2 - 5.1 mmol/L    Chloride 102 94 - 111 mmol/L    CO2 19 (L) 21 - 33 mmol/L    Anion gap 13 mmol/L    Glucose 157 (H) 70 - 110 mg/dL    BUN 17 9 - 21 mg/dL    Creatinine 1.00 0.8 - 1.50 mg/dL    BUN/Creatinine ratio 17      GFR est AA >60 ml/min/1.73m2    GFR est non-AA >60 ml/min/1.73m2    Calcium 9.4 8.5 - 10.5 mg/dL   BNP    Collection Time: 05/10/22 12:55 AM   Result Value Ref Range     (H) 0 - 100 pg/mL   LACTIC ACID    Collection Time: 05/10/22 12:55 AM   Result Value Ref Range    Lactic acid 1.8 0.5 - 2.0 mmol/L   TROPONIN I    Collection Time: 05/10/22 12:55 AM   Result Value Ref Range    Troponin-I, Qt. <0.02 (L) 0.02 - 0.05 ng/mL   CULTURE, BLOOD    Collection Time: 05/10/22  1:49 AM    Specimen: Blood   Result Value Ref Range    Special Requests: No Special Requests      Culture result: No growth after 5 hours     CULTURE, BLOOD    Collection Time: 05/10/22  1:55 AM    Specimen: Blood   Result Value Ref Range    Special Requests: No Special Requests      Culture result: No growth after 5 hours     TROPONIN I    Collection Time: 05/10/22  9:52 AM   Result Value Ref Range    Troponin-I, Qt. <0.02 (L) 0.02 - 0.05 ng/mL       XR Results (maximum last 3): Results from [place] encounter on 05/09/22    XR CHEST PORT    Impression  1. Interval development of mild pulmonary venous congestive changes with mild  bibasilar atelectasis infiltrate or edema. 2. Chronic bilateral interstitial changes. Results from [place] encounter on 09/24/20    XR CHEST PA LAT    Impression  IMPRESSION:      1. Postoperative changes as described without superimposed acute radiographic  cardiopulmonary abnormality.         Current Facility-Administered Medications:     sodium chloride (NS) flush 5-40 mL, 5-40 mL, IntraVENous, Q8H, Annalisa Proctor NP, 10 mL at 05/10/22 0634    sodium chloride (NS) flush 5-40 mL, 5-40 mL, IntraVENous, PRN, Jorge SWARTZ NP, 10 mL at 05/10/22 1026    acetaminophen (TYLENOL) tablet 650 mg, 650 mg, Oral, Q6H PRN **OR** acetaminophen (TYLENOL) suppository 650 mg, 650 mg, Rectal, Q6H PRN, Gerhardt Cornet, NP    polyethylene glycol (MIRALAX) packet 17 g, 17 g, Oral, DAILY PRN, Jorge SWARTZ NP    ondansetron (ZOFRAN ODT) tablet 4 mg, 4 mg, Oral, Q8H PRN **OR** ondansetron (ZOFRAN) injection 4 mg, 4 mg, IntraVENous, Q6H PRN, Gerhardt Cornet, NP    [START ON 5/11/2022] cefTRIAXone (ROCEPHIN) 1 g in 0.9% sodium chloride (MBP/ADV) 50 mL MBP, 1 g, IntraVENous, Q24H, Annalisa Proctor NP    [START ON 5/11/2022] azithromycin (ZITHROMAX) 500 mg in 0.9% sodium chloride 250 mL (VIAL-MATE), 500 mg, IntraVENous, Q24H, Annalisa Proctor NP    amiodarone (CORDARONE) tablet 200 mg, 200 mg, Oral, DAILY, Annalisa Proctor NP, 200 mg at 05/10/22 1022    apixaban (ELIQUIS) tablet 5 mg, 5 mg, Oral, BID, Luana SAWRTZ NP, 5 mg at 05/10/22 1022    aspirin delayed-release tablet 81 mg, 81 mg, Oral, DAILY, Annalisa Proctor NP, 81 mg at 05/10/22 1022    atorvastatin (LIPITOR) tablet 20 mg, 20 mg, Oral, DAILY, Annalisa Proctor NP, 20 mg at 05/10/22 1022    benzonatate (TESSALON) capsule 100 mg, 100 mg, Oral, TID PRN, Luana SWARTZ NP, 100 mg at 05/10/22 1021    brimonidine-timoloL (COMBIGAN) 0.2-0.5 % ophthalmic solution 1 Drop, 1 Drop, Both Eyes, Q12H, Annalisa Prcotor NP    carvediloL (COREG) tablet 12.5 mg, 12.5 mg, Oral, BID WITH MEALS, Luana SWARTZ NP, 12.5 mg at 05/10/22 1023    cholecalciferol (VITAMIN D3) (1000 Units /25 mcg) tablet 1,000 Units, 1,000 Units, Oral, DAILY, Luana SWARTZ NP, 1,000 Units at 05/10/22 1022    cyanocobalamin (VITAMIN B12) tablet 1,000 mcg, 1,000 mcg, Oral, DAILY, Luana SWARTZ NP, 1,000 mcg at 05/10/22 1023    docusate sodium (COLACE) capsule 100 mg, 100 mg, Oral, BID, Luana SWARTZ NP, 100 mg at 05/10/22 1021    furosemide (LASIX) tablet 20 mg, 20 mg, Oral, DAILY, Annalisa Proctor NP    latanoprost (XALATAN) 0.005 % ophthalmic solution 1 Drop, 1 Drop, Both Eyes, QHS, Annalisa Proctor NP    modafiniL (PROVIGIL) tablet 200 mg (Patient Supplied), 200 mg, Oral, DAILY, Luana SWARTZ NP    sacubitriL-valsartan (ENTRESTO) 24-26 mg tablet 1 Tablet, 1 Tablet, Oral, BID, Luana SWARTZ NP    spironolactone (ALDACTONE) tablet 25 mg, 25 mg, Oral, DAILY, Annalisa Proctor NP, 25 mg at 05/10/22 1022    albuterol-ipratropium (DUO-NEB) 2.5 MG-0.5 MG/3 ML, 3 mL, Nebulization, Q4H PRN, Danielle Medina NP    insulin lispro (HUMALOG) injection, , SubCUTAneous, AC&HS, Danielle Medina NP, 2 Units at 05/10/22 1278          Case discussed with collaborating physician Dr. Rock Matute and our impression and recommendations are as follows:   1. Ischemic cardiomyopathy   -previously diagnosed and followed by [facility] EF 40% 12/2021; will repeat echocardiogram   -PPM/ICD with atrial pacing   -recent testing for myocardial amyloid completed   -Continue Entresto, Lasix, Spironolactone, and Coreg  2. CAD   -CABG 2005, PCI x2 2011, most recent catheterization 5/2020 with patent stents/grafts   -No chest pain or ischemic changes noted on EKG; troponin negative x2    3. History of Atrial Fibrillation   -atrial pacing today   -continue Eliquis for CVA prevention  4. Hypoxia   -appears to be pulmonary in etiology   -IPF; increase in home therapy to include oxygen   -BNP 63; treatment for Pneumonia initiated   -Continue supplemental O2      Thank you for involving us in the care of this patient. Please do not hesitate to call if additional questions arise.  If after hours please call [phone]

## 2022-05-10 NOTE — PROGRESS NOTES
@ 0220 TRANSFER - IN REPORT:    Verbal report received from MAGDALENA Castillo RN(name) on Dalila Moraes  being received from ED (unit) for routine progression of care      Report consisted of patients Situation, Background, Assessment and   Recommendations(SBAR). Information from the following report(s) SBAR, ED Summary, [facility] and Recent Results was reviewed with the receiving nurse. Opportunity for questions and clarification was provided. Assessment completed upon patients arrival to unit and care assumed. @5937 Received care of pt from ED via stretcher. Pt ambulated short distance to bed. Atrial pacing on cardiac  Pt SOB on exertion. O2 @ 2 lpm via n/c, SATS 86%. O2 increased to 4 lpm via n/c. Lung sounds clear but diminished in  Upper lobes, diminished in bases. Pt has productive cough, thick bloody colored secretions. Skin warm and dry. INT patent. Pt is [place] bilaterally. Pt has hearing aids bilaterally. Pt oriented to call bell system and bed controls. @9999 Verbal shift change report given to NEIL Chu LPN (oncoming nurse) by NEIL Edge RN (offgoing nurse). Report included the following information Kardex, Intake/Output, MAR and Recent Results.

## 2022-05-10 NOTE — PROGRESS NOTES
Comprehensive Nutrition Assessment    Type and Reason for Visit: Initial    Nutrition Recommendations/Plan:   1. 4CHO choice/Cardiac diet     Malnutrition Assessment:  Malnutrition Status: At risk for malnutrition (specify) (respiratory distress) (05/10/22 1597)    Context:  Acute illness     Findings of the 6 clinical characteristics of malnutrition:   Energy Intake:  Mild decrease in energy intake (specify) (SOB/hypoxia requiring 7L HFNC)  Weight Loss:  No significant weight loss     Body Fat Loss:  No significant body fat loss,     Muscle Mass Loss:  No significant muscle mass loss,    Fluid Accumulation:  No significant fluid accumulation,     Strength:  Normal  strength         Nutrition Assessment:    67 yo male PMH: CAD, DM type 2, HTN, HLD, pulmonary fibrosis    Nutrition Related Findings:    Overweight BMI 29.5 Pt admitted due to SOB and hypoxia with hx of pulmonary fibrosis causing increased SOB supposed to have Home O2 delivered by will not arrive for another 2 weeks. Pt also with left sided PNA started on Rocephin and azithromycin. Currently requiring 7L High brandi NC. Placed on a Diabetic/Cardiac diet will monitor for pt ability to eat greater than 50% of meals before recommending supplement. BG currently well controlled.     Wound Type: None     Recent Results (from the past 24 hour(s))   BLOOD GAS, ARTERIAL    Collection Time: 05/10/22 12:30 AM   Result Value Ref Range    pH 7.46 (H) 7.37 - 7.43      PCO2 27 (L) 35.0 - 45.0 mmHg    PO2 57 (L) 84 - 98 mmHg    O2 SAT 90 (L) 94 - 100 %    BICARBONATE 19 (L) 23.0 - 27.0 mmol/L    BASE DEFICIT 3.9 (H) 0.0 - 2.5 mmol/L    O2 METHOD Nasal Cannula      O2 FLOW RATE 2 L/min    FIO2 28.0 %    Sample source Arterial      SITE Left Radial      HOLDEN'S TEST PASS      Carboxy-Hgb 1.2 0 - 3 %    Methemoglobin 0.3 0 - 3 %    tHb 13.6 12.0 - 16.0 g/dL    Oxyhemoglobin 88.7 (LL) 90 - 100 %   COVID-19 RAPID TEST    Collection Time: 05/10/22 12:35 AM   Result Value Ref Range    COVID-19 rapid test Not Detected Not Detected     INFLUENZA A & B AG (RAPID TEST)    Collection Time: 05/10/22 12:35 AM   Result Value Ref Range    Influenza A Antigen Negative Negative      Influenza B Antigen Negative Negative     EKG, 12 LEAD, INITIAL    Collection Time: 05/10/22 12:43 AM   Result Value Ref Range    Ventricular Rate 60 BPM    Atrial Rate 60 BPM    P-R Interval 203 ms    QRS Duration 121 ms    Q-T Interval 481 ms    QTC Calculation (Bezet) 481 ms    Calculated P Axis -159 degrees    Calculated R Axis 49 degrees    Calculated T Axis 45 degrees    Diagnosis       Atrial-paced complexes  IVCD, consider atypical RBBB  Inferior infarct, old  Consider anterior infarct  Lateral leads are also involved     CBC WITH AUTOMATED DIFF    Collection Time: 05/10/22 12:55 AM   Result Value Ref Range    WBC 15.3 (H) 4.6 - 13.2 K/uL    RBC 4.56 4.35 - 5.65 M/uL    HGB 13.1 13.0 - 16.0 g/dL    HCT 40.2 36.0 - 48.0 %    MCV 88.2 78.0 - 100.0 FL    MCH 28.7 24.0 - 34.0 PG    MCHC 32.6 31.0 - 37.0 g/dL    RDW 14.3 11.6 - 14.5 %    PLATELET 440 904 - 808 K/uL    MPV 10.4 9.2 - 11.8 FL    NRBC 0.0 0.0  WBC    ABSOLUTE NRBC 0.00 0.00 - 0.01 K/uL    NEUTROPHILS 81 (H) 40 - 73 %    LYMPHOCYTES 8 (L) 21 - 52 %    MONOCYTES 7 3 - 10 %    EOSINOPHILS 2 0 - 5 %    BASOPHILS 1 0 - 2 %    IMMATURE GRANULOCYTES 1 (H) 0 - 0.5 %    ABS. NEUTROPHILS 12.5 (H) 1.8 - 8.0 K/UL    ABS. LYMPHOCYTES 1.2 0.9 - 3.6 K/UL    ABS. MONOCYTES 1.0 0.05 - 1.2 K/UL    ABS. EOSINOPHILS 0.3 0.0 - 0.4 K/UL    ABS. BASOPHILS 0.1 0.0 - 0.1 K/UL    ABS. IMM.  GRANS. 0.1 (H) 0.00 - 0.04 K/UL    DF AUTOMATED     METABOLIC PANEL, BASIC    Collection Time: 05/10/22 12:55 AM   Result Value Ref Range    Sodium 134 (L) 135 - 145 mmol/L    Potassium 4.0 3.2 - 5.1 mmol/L    Chloride 102 94 - 111 mmol/L    CO2 19 (L) 21 - 33 mmol/L    Anion gap 13 mmol/L    Glucose 157 (H) 70 - 110 mg/dL    BUN 17 9 - 21 mg/dL    Creatinine 1.00 0.8 - 1.50 mg/dL BUN/Creatinine ratio 17      GFR est AA >60 ml/min/1.73m2    GFR est non-AA >60 ml/min/1.73m2    Calcium 9.4 8.5 - 10.5 mg/dL   BNP    Collection Time: 05/10/22 12:55 AM   Result Value Ref Range     (H) 0 - 100 pg/mL   LACTIC ACID    Collection Time: 05/10/22 12:55 AM   Result Value Ref Range    Lactic acid 1.8 0.5 - 2.0 mmol/L   TROPONIN I    Collection Time: 05/10/22 12:55 AM   Result Value Ref Range    Troponin-I, Qt. <0.02 (L) 0.02 - 0.05 ng/mL   CULTURE, BLOOD    Collection Time: 05/10/22  1:49 AM    Specimen: Blood   Result Value Ref Range    Special Requests: No Special Requests      Culture result: No growth after 5 hours     CULTURE, BLOOD    Collection Time: 05/10/22  1:55 AM    Specimen: Blood   Result Value Ref Range    Special Requests: No Special Requests      Culture result: No growth after 5 hours     TROPONIN I    Collection Time: 05/10/22  9:52 AM   Result Value Ref Range    Troponin-I, Qt. <0.02 (L) 0.02 - 0.05 ng/mL   GLUCOSE, POC    Collection Time: 05/10/22 11:27 AM   Result Value Ref Range    Glucose (POC) 85 70 - 110 mg/dL    Performed by Jordi Phillips        Current Nutrition Intake & Therapies:  Average Meal Intake: %  Average Supplement Intake: None ordered  ADULT DIET Regular; 4 carb choices (60 gm/meal); Low Fat/Low Chol/High Fiber/BERE    Anthropometric Measures:  Height: 5' 8\" (172.7 cm)  Ideal Body Weight (IBW): 154 lbs (70 kg)  Admission Body Weight: 193 lb  Current Body Wt:  87.5 kg (193 lb), 125.3 % IBW. Bed scale  Current BMI (kg/m2): 29.4                          BMI Category: Overweight (BMI 25.0-29. 9)    Estimated Daily Nutrient Needs:  Energy Requirements Based On: Kcal/kg (20-25 kcal/kg)  Weight Used for Energy Requirements: Admission (88 kg)  Energy (kcal/day): 2308-8058 kcal/day  Weight Used for Protein Requirements: Admission (0.8-1 g/kg)  Protein (g/day): 70-88 g/day  Method Used for Fluid Requirements: 1 ml/kcal  Fluid (ml/day): 1662-7488 mL/day    Nutrition Diagnosis:   · Predicted inadequate energy intake related to impaired respiratory function as evidenced by intake 0-25%      Nutrition Interventions:   Food and/or Nutrient Delivery: Continue current diet  Nutrition Education/Counseling: Education not appropriate  Coordination of Nutrition Care: Continue to monitor while inpatient       Goals:     Goals: PO intake 75% or greater,by next RD assessment       Nutrition Monitoring and Evaluation:      Food/Nutrient Intake Outcomes: Food and nutrient intake  Physical Signs/Symptoms Outcomes: Meal time behavior,Weight,Biochemical data     F/U: 5/12/2022    Discharge Planning:    Continue current diet    [address]: [phone]

## 2022-05-10 NOTE — PROGRESS NOTES
Problem: Diabetes Self-Management  Goal: *Developing strategies to promote health/change behavior  Description: Define the ABC's of diabetes; identify appropriate screenings, schedule and personal plan for screenings. Outcome: Progressing Towards Goal  Goal: *Monitoring blood glucose, interpreting and using results  Description: Identify recommended blood glucose targets  and personal targets.   Outcome: Progressing Towards Goal     Problem: Breathing Pattern - Ineffective  Goal: *Use of effective breathing techniques  Outcome: Progressing Towards Goal     Problem: General Medical Care Plan  Goal: *Vital signs within specified parameters  Outcome: Progressing Towards Goal  Goal: *Optimal pain control at patient's stated goal  Outcome: Progressing Towards Goal  Goal: *Skin integrity maintained  Outcome: Progressing Towards Goal  Goal: *Fluid volume balance  Outcome: Progressing Towards Goal  Goal: *Progressive mobility and function (eg: ADL's)  Outcome: Progressing Towards Goal     Problem: Tissue Perfusion - Cardiopulmonary, Altered  Goal: *Optimize tissue perfusion  Outcome: Progressing Towards Goal     Problem: Breathing Pattern - Ineffective  Goal: *Absence of hypoxia  Outcome: Not Progressing Towards Goal     Problem: General Medical Care Plan  Goal: *Labs within defined limits  Outcome: Not Progressing Towards Goal  Goal: *Absence of infection signs and symptoms  Outcome: Not Progressing Towards Goal  Goal: *Optimize nutritional status  Outcome: Not Progressing Towards Goal     Problem: Tissue Perfusion - Cardiopulmonary, Altered  Goal: *Absence of hypoxia  Outcome: Not Progressing Towards Goal

## 2022-05-11 NOTE — PROGRESS NOTES
Received report  Via walking rounds , has bapap on 02 sats 94% awaiting to be transfer to [facility] ICU.  @2277  Transferred center called pt has a bed, rm 10 at [address]      Pt's family in to visit ,informed them that pt will be transfer to [facility]. Family took all his belonbings except his hearing aides which are in his ears. [id number]  Transferred report called BRONWYN Anna RN at [facility] ICU for rm. 10.  1115  [facility] here to transport pt to [address] [place] [address] James Meyers, bipap placed on pt to transfer. Michael Armendariz

## 2022-05-11 NOTE — PROGRESS NOTES
CARDIOLOGY PROGRESS NOTE - NP    Patient seen and examined. This is a patient who is followed for ischemic cardiomyopathy, atrial fibrillation, and coronary artery disease. He desaturated to 84% on 15L high flow oxygen; now on BIPAP. He is awaiting transfer to [facility] for continuity of pulmonary and cardiology care. He denies chest pain or lightheadedness. He is short of breath. No other complaints reported. Telemetry reviewed, there were no events noted in the past 24 hours. He is in sinus rhythm in the 60s. Pertinent review of systems items noted above, all other systems are negative. Current medications reviewed. Physical Examination  Blood pressure 98/62, Pulse 63  No apparent distress. Heart is regular, rate and rhythm. Normal S1, S2, no murmurs are appreciated. Lungs are diminished bilaterally. On BiPAP. Abdomen is soft, nontender, normal bowel sounds. Extremities have no edema. Recent labs results and imaging reviewed.       Recent Results (from the past 24 hour(s))   TROPONIN-HIGH SENSITIVITY    Collection Time: 05/10/22  3:00 PM   Result Value Ref Range    Troponin-High Sensitivity 11 0 - 78 ng/L   GLUCOSE, POC    Collection Time: 05/10/22  4:00 PM   Result Value Ref Range    Glucose (POC) 102 70 - 110 mg/dL    Performed by [address] [place] [address], POC    Collection Time: 05/10/22  9:14 PM   Result Value Ref Range    Glucose (POC) 123 (H) 70 - 110 mg/dL    Performed by Mayra Heal    BLOOD GAS, ARTERIAL    Collection Time: 05/10/22 11:50 PM   Result Value Ref Range    pH 7.46 (H) 7.37 - 7.43      PCO2 27 (L) 35.0 - 45.0 mmHg    PO2 56 (L) 84 - 98 mmHg    O2 SAT 90 (L) 94 - 100 %    BICARBONATE 18 (L) 23.0 - 27.0 mmol/L    BASE DEFICIT 4.2 (H) 0.0 - 2.5 mmol/L    O2 METHOD Non-rebreathing mask      O2 FLOW RATE 15 L/min    FIO2 95.0 %    Sample source Arterial      SITE Right Radial      HOLDEN'S TEST PASS      Carboxy-Hgb 0.8 0 - 3 %    Methemoglobin 0.1 0 - 3 %    tHb 12.4 12.0 - 16.0 g/dL    Oxyhemoglobin 89.1 (LL) 90 - 100 %   URINALYSIS W/MICROSCOPIC    Collection Time: 05/11/22  1:30 AM   Result Value Ref Range    Color Dark Yellow      Appearance Clear      Specific gravity 1.022 1.005 - 1.030      pH (UA) 5.5 5.0 - 8.0      Protein Negative Negative mg/dL    Glucose Negative Negative mg/dL    Ketone Trace (A) Negative mg/dL    Bilirubin Negative Negative      Blood Negative Negative      Urobilinogen 1.0 0.2 - 1.0 EU/dL    Nitrites Negative Negative      Leukocyte Esterase Trace (A) Negative      WBC 5-10 0 - 4 /hpf    RBC 0-5 0 - 2 /hpf    Epithelial cells Few 0 - 20 /lpf    Bacteria 3+ (A) None /hpf   METABOLIC PANEL, COMPREHENSIVE    Collection Time: 05/11/22  3:17 AM   Result Value Ref Range    Sodium 133 (L) 136 - 145 mmol/L    Potassium 4.0 3.5 - 5.5 mmol/L    Chloride 104 100 - 111 mmol/L    CO2 21 21 - 32 mmol/L    Anion gap 8 3.0 - 18.0 mmol/L    Glucose 153 (H) 74 - 99 mg/dL    BUN 16 7 - 18 mg/dL    Creatinine 0.98 0.60 - 1.30 mg/dL    BUN/Creatinine ratio 16 12 - 20      GFR est AA >60 >60 ml/min/1.73m2    GFR est non-AA >60 >60 ml/min/1.73m2    Calcium 8.3 (L) 8.5 - 10.1 mg/dL    Bilirubin, total 1.2 (H) 0.2 - 1.0 mg/dL    AST (SGOT) 49 (H) 10 - 38 U/L    ALT (SGPT) 30 16 - 61 U/L    Alk.  phosphatase 68 45 - 117 U/L    Protein, total 7.1 6.4 - 8.2 g/dL    Albumin 3.0 (L) 3.4 - 5.0 g/dL    Globulin 4.1 (H) 2.0 - 4.0 g/dL    A-G Ratio 0.7 (L) 0.8 - 1.7     MAGNESIUM    Collection Time: 05/11/22  3:17 AM   Result Value Ref Range    Magnesium 1.3 (L) 1.6 - 2.6 mg/dL   CBC WITH AUTOMATED DIFF    Collection Time: 05/11/22  3:17 AM   Result Value Ref Range    WBC 17.5 (H) 4.6 - 13.2 K/uL    RBC 4.02 (L) 4.35 - 5.65 M/uL    HGB 11.6 (L) 13.0 - 16.0 g/dL    HCT 35.4 (L) 36.0 - 48.0 %    MCV 88.1 78.0 - 100.0 FL    MCH 28.9 24.0 - 34.0 PG    MCHC 32.8 31.0 - 37.0 g/dL    RDW 14.3 11.6 - 14.5 %    PLATELET 843 674 - 229 K/uL    MPV 10.8 9.2 - 11.8 FL    NRBC 0.0 0.0  WBC ABSOLUTE NRBC 0.00 0.00 - 0.01 K/uL    NEUTROPHILS 84 (H) 40 - 73 %    LYMPHOCYTES 7 (L) 21 - 52 %    MONOCYTES 5 3 - 10 %    EOSINOPHILS 2 0 - 5 %    BASOPHILS 1 0 - 2 %    IMMATURE GRANULOCYTES 1 (H) 0 - 0.5 %    ABS. NEUTROPHILS 14.9 (H) 1.8 - 8.0 K/UL    ABS. LYMPHOCYTES 1.2 0.9 - 3.6 K/UL    ABS. MONOCYTES 0.9 0.05 - 1.2 K/UL    ABS. EOSINOPHILS 0.3 0.0 - 0.4 K/UL    ABS. BASOPHILS 0.1 0.0 - 0.1 K/UL    ABS. IMM. GRANS. 0.2 (H) 0.00 - 0.04 K/UL    DF AUTOMATED         XR Results (maximum last 3): Results from Hospital Encounter encounter on 05/09/22    XR CHEST PORT    Impression  Bilateral parenchymal/interstitial opacities, slightly worsened from prior  study. XR CHEST PORT    Impression  1. Interval development of mild pulmonary venous congestive changes with mild  bibasilar atelectasis infiltrate or edema. 2. Chronic bilateral interstitial changes. Results from [place] encounter on 09/24/20    XR CHEST PA LAT    Impression  IMPRESSION:      1. Postoperative changes as described without superimposed acute radiographic  cardiopulmonary abnormality.         Current Facility-Administered Medications:     sodium chloride (NS) flush 5-40 mL, 5-40 mL, IntraVENous, Q8H, Annalisa Proctor, NP, 10 mL at 05/11/22 0605    sodium chloride (NS) flush 5-40 mL, 5-40 mL, IntraVENous, PRN, Bruno Love A, NP, 10 mL at 05/10/22 1026    acetaminophen (TYLENOL) tablet 650 mg, 650 mg, Oral, Q6H PRN, 650 mg at 05/11/22 0000 **OR** acetaminophen (TYLENOL) suppository 650 mg, 650 mg, Rectal, Q6H PRN, Chelo Jluian NP    polyethylene glycol (MIRALAX) packet 17 g, 17 g, Oral, DAILY PRN, Bruno Love A, NP    ondansetron (ZOFRAN ODT) tablet 4 mg, 4 mg, Oral, Q8H PRN **OR** ondansetron (ZOFRAN) injection 4 mg, 4 mg, IntraVENous, Q6H PRN, Bruno SWARTZ NP    cefTRIAXone (ROCEPHIN) 1 g in 0.9% sodium chloride (MBP/ADV) 50 mL MBP, 1 g, IntraVENous, Q24H, [place] Proctor, YANY, Last Rate: 100 mL/hr at 05/11/22 0320, 1 g at 05/11/22 0320    azithromycin (ZITHROMAX) 500 mg in 0.9% sodium chloride 250 mL (VIAL-MATE), 500 mg, IntraVENous, Q24H, Annalisa Proctor NP, Last Rate: 250 mL/hr at 05/11/22 0319, 500 mg at 05/11/22 0319    amiodarone (CORDARONE) tablet 200 mg, 200 mg, Oral, DAILY, Unk Cassis A, NP, 200 mg at 05/11/22 0908    apixaban (ELIQUIS) tablet 5 mg, 5 mg, Oral, BID, Unk Cassis A, NP, 5 mg at 05/11/22 0915    aspirin delayed-release tablet 81 mg, 81 mg, Oral, DAILY, Unk Cassis A, NP, 81 mg at 05/11/22 0908    atorvastatin (LIPITOR) tablet 20 mg, 20 mg, Oral, DAILY, Unk Cassis A, NP, 20 mg at 05/11/22 0911    benzonatate (TESSALON) capsule 100 mg, 100 mg, Oral, TID PRN, Senthilk Cassis A, NP, 100 mg at 05/10/22 2038    [Held by provider] carvediloL (COREG) tablet 12.5 mg, 12.5 mg, Oral, BID WITH MEALS, Unk Cassis A, NP, 12.5 mg at 05/10/22 1704    cholecalciferol (VITAMIN D3) (1000 Units /25 mcg) tablet 1,000 Units, 1,000 Units, Oral, DAILY, Unk Cassis A, NP, 1,000 Units at 05/11/22 0914    cyanocobalamin (VITAMIN B12) tablet 1,000 mcg, 1,000 mcg, Oral, DAILY, Unk Cassis A, NP, 1,000 mcg at 05/11/22 0912    docusate sodium (COLACE) capsule 100 mg, 100 mg, Oral, BID, Unk Cassis A, NP, 100 mg at 05/11/22 0914    [START ON 5/12/2022] furosemide (LASIX) tablet 20 mg, 20 mg, Oral, Once per day on Sun Tue Thu Sat, Annalisa Proctor NP    latanoprost (XALATAN) 0.005 % ophthalmic solution 1 Drop, 1 Drop, Both Eyes, QHS, Uneliana SWARTZ NP, 1 Drop at 05/10/22 2107    [Held by provider] sacubitriL-valsartan (ENTRESTO) 24-26 mg tablet 1 Tablet - patient supplied (Patient Supplied), 1 Tablet, Oral, DAILY, Karla Riggins NP    [Held by provider] spironolactone (ALDACTONE) tablet 25 mg, 25 mg, Oral, DAILY, Jordan SWARTZ NP, 25 mg at 05/10/22 1022    albuterol-ipratropium (DUO-NEB) 2.5 MG-0.5 MG/3 ML, 3 mL, Nebulization, Q4H PRN, Luana SWARTZ NP    insulin lispro (HUMALOG) injection, , SubCUTAneous, AC&HS, Luana SWARTZ NP, 2 Units at 05/10/22 0804    brimonidine (ALPHAGAN) 0.2 % ophthalmic solution 1 Drop, 1 Drop, Both Eyes, Q12H, 1 Drop at 05/11/22 0900 **AND** timolol (TIMOPTIC) 0.5 % ophthalmic solution 1 Drop, 1 Drop, Both Eyes, Q12H, Annalisa Proctor NP, 1 Drop at 05/11/22 0900    guaiFENesin ER (MUCINEX) tablet 600 mg, 600 mg, Oral, BID, Luis M English MD, 600 mg at 05/11/22 0912    furosemide (LASIX) tablet 10 mg, 10 mg, Oral, Once per day on Mon Wed Fri, Luis M English MD, 10 mg at 05/11/22 0912    [Held by provider] sacubitriL-valsartan (ENTRESTO) 24-26 mg tablet 2 Tablet - patient supplied (Patient Supplied), 2 Tablet, Oral, QHS, Luis M English MD, 2 Tablet at 05/10/22 2034    Case discussed with collaborating physician Dr. Rock Matute and our impression and recommendations are as follows:   1. Ischemic cardiomyopathy   -Patient is followed by [address]   -EF 40% per echocardiogram in 12/2021   -PPM/ICD in place with atrial pacing   -recent testing for myocardial amyloid completed   -We will hold Entresto, spironolactone, and Coreg for now due to soft blood pressures    2. CAD   -CABG 2005, PCI x2 2011, most recent catheterization 5/2020 with patent stents/grafts   -No chest pain or ischemic changes noted on EKG; troponin negative x2    3. History of Atrial Fibrillation   -atrial pacing today   -On carvedilol, but this is on hold due to soft blood pressures   -continue Eliquis for CVA prevention   -Keep serum potassium between 4-5 and serum magnesium > 2.   -Continue telemetry monitoring. 4. Hypoxia   -appears to be pulmonary in etiology   -IPF; he follows with [facility] pulmonology and transfer is pending to [facility]   -BNP 63; he appears euvolemic   -Now on BiPAP    Thank you for involving us in the care of this patient.   Please do not hesitate to call if additional questions arise. If after hours please call [phone].

## 2022-05-11 NOTE — PROGRESS NOTES
Problem: Diabetes Self-Management  Goal: *Disease process and treatment process  Description: Define diabetes and identify own type of diabetes; list 3 options for treating diabetes. Outcome: Progressing Towards Goal  Goal: *Incorporating nutritional management into lifestyle  Description: Describe effect of type, amount and timing of food on blood glucose; list 3 methods for planning meals. Outcome: Progressing Towards Goal  Goal: *Incorporating physical activity into lifestyle  Description: State effect of exercise on blood glucose levels. Outcome: Progressing Towards Goal  Goal: *Developing strategies to promote health/change behavior  Description: Define the ABC's of diabetes; identify appropriate screenings, schedule and personal plan for screenings. Outcome: Progressing Towards Goal  Goal: *Using medications safely  Description: State effect of diabetes medications on diabetes; name diabetes medication taking, action and side effects. Outcome: Progressing Towards Goal  Goal: *Monitoring blood glucose, interpreting and using results  Description: Identify recommended blood glucose targets  and personal targets. Outcome: Progressing Towards Goal  Goal: *Prevention, detection, treatment of acute complications  Description: List symptoms of hyper- and hypoglycemia; describe how to treat low blood sugar and actions for lowering  high blood glucose level. Outcome: Progressing Towards Goal  Goal: *Prevention, detection and treatment of chronic complications  Description: Define the natural course of diabetes and describe the relationship of blood glucose levels to long term complications of diabetes.   Outcome: Progressing Towards Goal  Goal: *Developing strategies to address psychosocial issues  Description: Describe feelings about living with diabetes; identify support needed and support network  Outcome: Progressing Towards Goal  Goal: *Insulin pump training  Outcome: Progressing Towards Goal  Goal: *Sick day guidelines  Outcome: Progressing Towards Goal  Goal: *Patient Specific Goal (EDIT GOAL, INSERT TEXT)  Outcome: Progressing Towards Goal     Problem: Patient Education: Go to Patient Education Activity  Goal: Patient/Family Education  Outcome: Progressing Towards Goal     Problem: Breathing Pattern - Ineffective  Goal: *Absence of hypoxia  Outcome: Progressing Towards Goal  Goal: *Use of effective breathing techniques  Outcome: Progressing Towards Goal  Goal: *PALLIATIVE CARE:  Alleviation of Dyspnea  Outcome: Progressing Towards Goal     Problem: Patient Education: Go to Patient Education Activity  Goal: Patient/Family Education  Outcome: Progressing Towards Goal     Problem: General Medical Care Plan  Goal: *Vital signs within specified parameters  Outcome: Progressing Towards Goal  Goal: *Labs within defined limits  Outcome: Progressing Towards Goal  Goal: *Absence of infection signs and symptoms  Outcome: Progressing Towards Goal  Goal: *Optimal pain control at patient's stated goal  Outcome: Progressing Towards Goal  Goal: *Skin integrity maintained  Outcome: Progressing Towards Goal  Goal: *Fluid volume balance  Outcome: Progressing Towards Goal  Goal: *Optimize nutritional status  Outcome: Progressing Towards Goal  Goal: *Anxiety reduced or absent  Outcome: Progressing Towards Goal  Goal: *Progressive mobility and function (eg: ADL's)  Outcome: Progressing Towards Goal     Problem: Patient Education: Go to Patient Education Activity  Goal: Patient/Family Education  Outcome: Progressing Towards Goal     Problem: Tissue Perfusion - Cardiopulmonary, Altered  Goal: *Optimize tissue perfusion  Outcome: Progressing Towards Goal  Goal: *Absence of hypoxia  Outcome: Progressing Towards Goal     Problem: Patient Education: Go to Patient Education Activity  Goal: Patient/Family Education  Outcome: Progressing Towards Goal     Problem: Falls - Risk of  Goal: *Absence of Falls  Description: Document Delmer Fall Risk and appropriate interventions in the flowsheet.   Outcome: Progressing Towards Goal  Note: Fall Risk Interventions:            Medication Interventions: Patient to call before getting OOB,Teach patient to arise slowly                   Problem: Patient Education: Go to Patient Education Activity  Goal: Patient/Family Education  Outcome: Progressing Towards Goal     Problem: Nutrition Deficit  Goal: *Optimize nutritional status  Outcome: Progressing Towards Goal

## 2022-05-11 NOTE — PROGRESS NOTES
Discharge planning/Transition of Care RUR 11%. Due to decline in condition pt will be transferred to [facility] [place]  [address] ambulance for Pulmonary.

## 2022-05-11 NOTE — PROGRESS NOTES
Called to bedside due to increased shortness of breath oxygen saturation is in the high 80s and patient is on a nonrebreather. When I talked with patient he has had increased shortness of breath coughing and is also having some blood in his sputum. Lungs with bilateral crackles to the bases. I will obtain an ABG. Stat port xray7, and Bipap ordered. Discussed advanced directives with patient he does request intubation if needed. 0015 ABG a little worse from last night but patient is also having an increased oxygen demand. Chest x-ray showing pulmonary congestion much worse from last night x-ray. BiPAP is ordered and I will also give a dose of 40 mg of Lasix, 1/2 inch of Nitropaste, and nurse requested Lazcano catheter for accurate urine output. I will reevaluate after BiPAP. 0130 she doing much better on BiPAP states he feels better oxygen saturations are 95%. Patient had a Lazcano catheter placed and is diuresing well. Given patient's history of pulmonary fibrosis pneumonia and now having increased shortness of breath hypoxia and what appears to be some congestive heart failure I think would benefit the patient to be at the hospital where his pulmonologist is available. I called the transfer center to try to transfer patient to [facility] to their ICU. I talked with patient's daughter Coral Villasenor about status and the progress on transfer. 0400 Talked with Evert Meeks NP, hospitalist with [facility] she accepted pt for transfer to their ICU. Dr. Huyen Parker will be the accepting MD. We will be waiting for a bed assignment. I have personally provided __45_ minutes of critical care time exclusive of time spent on separately billable procedures. Time includes review of laboratory data, radiology results, discussion with consultants, and monitoring for potential decompensation. Interventions were performed as documented above.           Zunilda Mccullough ENP-C, YANNP-C

## 2022-05-11 NOTE — PROGRESS NOTES
10 minute trial off BIPAP, pt placed on 15L HFNC, the pt oxygen saturation decreased to 84%. Pt placed back on BIPAP, will continue to monitor and wean as tolerated.

## 2022-05-12 NOTE — PROGRESS NOTES
Physician Progress Note      PATIENTDaCHRISTINA Gonzalez  [facility] #:                  [id number]  :                       1945  ADMIT DATE:       2022 11:45 PM  [address] DATE:        2022 11:15 AM  RESPONDING  PROVIDER #:        Yamilka Le MD          QUERY TEXT:    Dear Hospitalist,    Pt admitted with Pneumonia. Pt noted to have Elevated WBCs, Temp, and RR within 24 hours of admission. . If possible, please document in the progress notes and discharge summary if you are evaluating and /or treating any of the following: The medical record reflects the following:  Risk Factors: 67 y/o, Pneumonia, Pulmonary Fibrosis    Clinical Indicators:  * WBCs: 15.3 ->17.5  * Axillary Temp 101.1  * RR>20 up to 32    Treatment: IV Zithromax and Rocephin, ordered urine and blood cultures and lactic acid;  serial CBCs and BMPs    Thank you,  Mimi JON, RN, CRCR  Please contact me for any questions or concerns regarding this query at [email or web address]  Options provided:  -- Sepsis, present on admission  -- Pneumonia without Sepsis  -- Other - I will add my own diagnosis  -- Disagree - Not applicable / Not valid  -- Disagree - Clinically unable to determine / Unknown  -- Refer to Clinical Documentation Reviewer    PROVIDER RESPONSE TEXT:    This patient has sepsis which was present on admission. Query created by: Germaine Lennox on 2022 10:30 AM      QUERY TEXT:    Dear Hospitalist,    Pt admitted with Pneumonia and severe pulmonary fibrosis. Pt noted to have SOB, O2 sat 82%, RR up to 32, requiring BiPAP and Hi flow O2 and reported to have qualified for Home Oxygen. If possible, please document in the progress notes and discharge summary if you are evaluating and/or treating any of the following:     The medical record reflects the following:  Risk Factors: Pneumonia, Pulmonary Fibrosis, Qualified for Home O2    Clinical Indicators:  O2 Sats: down to 82%  RR: up to 32  SOB  ABG: pH 7.46; pCO2 27; PO2 57; Bicarb 19;  O2 Sat 90%    Treatment: ABGs, RT Consult and treat, duonebs, Hi-flow O2, BiPAP    Thank you,  Kelli Blizzard BSN, RN, CRCR  Please contact me for any questions or concerns regarding this query at [email or web address]  Options provided:  -- Acute respiratory failure with hypoxia  -- Acute respiratory failure with hypercapnia  -- Acute respiratory failure with hypoxia and hypercapnia  -- Acute on chronic respiratory failure with hypoxia  -- Acute on chronic respiratory failure with hypercapnia  -- Acute on chronic respiratory failure with hypoxia and hypercapnia  -- Other - I will add my own diagnosis  -- Disagree - Not applicable / Not valid  -- Disagree - Clinically unable to determine / Unknown  -- Refer to Clinical Documentation Reviewer    PROVIDER RESPONSE TEXT:    This patient is in acute respiratory failure with hypoxia.     Query created by: Joey Soriano on 5/11/2022 10:25 AM      Electronically signed by:  Sudhir Pierre MD 5/12/2022 2:53 PM

## 2022-05-17 NOTE — DISCHARGE SUMMARY
.          Physician Discharge Summary       Patient: Carlito Maynard MRN: [id number]     YOB: 1945  Age: 68 y.o. Sex: male    PCP: Tish Salinas NP   Allergies: Patient has no known allergies. Admit date: 5/9/2022  Admitting Provider: Michelle Lake MD    Discharge date: 5/16/2022  Discharging Provider: Kulwant Kerr NP    Admission Diagnoses:   Hypoxemia [R09.02]  Pneumonia [J18.9]    Discharge Diagnoses:    Hospital Problems as of 5/11/2022 Date Reviewed: 5/6/2022          Codes Class Noted - Resolved POA    Hypoxemia ICD-10-CM: R09.02  ICD-9-CM: 799.02  5/10/2022 - Present Unknown        Pneumonia ICD-10-CM: J18.9  ICD-9-CM: 108  5/10/2022 - Present Unknown        Pulmonary fibrosis ([facility] ICD-10-CM: J84.10  ICD-9-CM: 764  5/10/2022 - Present Unknown        Essential hypertension ICD-10-CM: I10  ICD-9-CM: 401.9  8/10/2020 - Present Yes        Mixed hyperlipidemia ICD-10-CM: E78.2  ICD-9-CM: 272.2  8/10/2020 - Present Yes        Diabetes mellitus type 2, controlled ([facility] ICD-10-CM: E11.9  ICD-9-CM: 250.00  8/10/2020 - Present Yes              Hospital Course:   Carlito Maynard is a 68 y.o. male  has a past medical history of CAD (coronary artery disease), Diabetes mellitus type 2, controlled ([facility] (8/10/2020), Essential hypertension (8/10/2020), and Mixed hyperlipidemia (8/10/2020). Patient seen at bedside emergency department. Patient came to the emergency room tonight for shortness of breath and hypoxia. Patient was supposed to have oxygen delivered to his home and he said is not supposed to be there for about 2 weeks. Patient has a history of pulmonary fibrosis and has been having an increase in shortness of breath. No chest pain has been reported. In the emergency room patient was found to have a left-sided pneumonia treated with Rocephin and azithromycin. Patient will be admitted to telemetry on oxygen he will be treated for his pneumonia with Rocephin and azithromycin.   A.m. labs Case management consult for oxygen on discharge from the hospital.  Cardiology consult and pulmonary consult. Patient's respiratory status continued to worsen patient was placed on BiPAP he did not improve on BiPAP given patient's history of pulmonary fibrosis and pneumonia he was better served at a facility where his pulmonologist was available. Patient accepted by hospitalist and was awaiting bed. Patient was then transferred several hours later after bed availability and in ICU at [facility].      Consults:  Hospitalist at [address] [place] [address] Road of breath on BiPAP      Discharge Exam:     Physical Exam  Vitals and nursing note reviewed. Constitutional:       Appearance: He is well-developed. He is ill-appearing. HENT:      Head: Normocephalic and atraumatic. Nose: Nose normal.   Eyes:      Conjunctiva/sclera: Conjunctivae normal.      Pupils: Pupils are equal, round, and reactive to light. Cardiovascular:      Rate and Rhythm: Normal rate and regular rhythm. Pulses: Normal pulses. Heart sounds: Normal heart sounds. Pulmonary:      Effort: Pulmonary effort is normal. No respiratory distress. Breath sounds: No stridor. Rhonchi present. Comments: Few scattered faint rhonchi on BiPAP  Abdominal:      General: Bowel sounds are normal. There is no distension. Palpations: Abdomen is soft. Tenderness: There is no abdominal tenderness. Musculoskeletal:         General: Normal range of motion. Cervical back: Normal range of motion and neck supple. Right lower leg: No edema. Left lower leg: No edema. Skin:     General: Skin is warm and dry. Neurological:      Mental Status: He is alert and oriented to person, place, and time. Psychiatric:         Behavior: Behavior normal.         Thought Content:  Thought content normal.         Judgment: Judgment normal.                Discharge Condition: stable  Disposition: ICU at  [facility]    Discharge Medications:  Discharge Medication List as of 5/11/2022  1:50 PM          Follow-up Care/Patient Instructions: Total time in minutes spent coordinating this discharge, going over instructions, follow-up, prescriptions, and documentation  30 minutes    Signed: Matthew Clay ENP-C, FNP-C    5/16/2022  9:19 PM        Dragon voice recognition was used to generate this report, which may have resulted in some phonetic based errors in grammar and contents.  Even though attempts were made to correct all the mistakes, some may have been missed, and remained in the body of the document